# Patient Record
Sex: MALE | Race: WHITE | NOT HISPANIC OR LATINO | Employment: FULL TIME | URBAN - METROPOLITAN AREA
[De-identification: names, ages, dates, MRNs, and addresses within clinical notes are randomized per-mention and may not be internally consistent; named-entity substitution may affect disease eponyms.]

---

## 2019-05-07 ENCOUNTER — TRANSFERRED RECORDS (OUTPATIENT)
Dept: HEALTH INFORMATION MANAGEMENT | Facility: CLINIC | Age: 54
End: 2019-05-07

## 2019-05-08 ENCOUNTER — TRANSFERRED RECORDS (OUTPATIENT)
Dept: HEALTH INFORMATION MANAGEMENT | Facility: CLINIC | Age: 54
End: 2019-05-08

## 2019-05-08 ENCOUNTER — MEDICAL CORRESPONDENCE (OUTPATIENT)
Dept: HEALTH INFORMATION MANAGEMENT | Facility: CLINIC | Age: 54
End: 2019-05-08

## 2019-05-29 DIAGNOSIS — Z98.1 S/P LUMBAR SPINAL FUSION: Primary | ICD-10-CM

## 2019-05-29 DIAGNOSIS — M54.9 BACK PAIN: ICD-10-CM

## 2019-07-13 ASSESSMENT — ENCOUNTER SYMPTOMS
MYALGIAS: 1
TREMORS: 1
NECK PAIN: 0
SEIZURES: 0
MUSCLE CRAMPS: 1
SWOLLEN GLANDS: 0
SPEECH CHANGE: 0
STIFFNESS: 1
HEADACHES: 0
ARTHRALGIAS: 1
DISTURBANCES IN COORDINATION: 0
LOSS OF CONSCIOUSNESS: 0
PARALYSIS: 0
NUMBNESS: 1
BRUISES/BLEEDS EASILY: 1
WEAKNESS: 1
MEMORY LOSS: 1
TINGLING: 1
BACK PAIN: 1
JOINT SWELLING: 1
MUSCLE WEAKNESS: 1
DIZZINESS: 0

## 2019-07-15 ENCOUNTER — ANCILLARY PROCEDURE (OUTPATIENT)
Dept: GENERAL RADIOLOGY | Facility: CLINIC | Age: 54
End: 2019-07-15
Attending: NEUROLOGICAL SURGERY
Payer: COMMERCIAL

## 2019-07-15 ENCOUNTER — OFFICE VISIT (OUTPATIENT)
Dept: NEUROSURGERY | Facility: CLINIC | Age: 54
End: 2019-07-15
Payer: COMMERCIAL

## 2019-07-15 ENCOUNTER — ANCILLARY PROCEDURE (OUTPATIENT)
Dept: CT IMAGING | Facility: CLINIC | Age: 54
End: 2019-07-15
Attending: NEUROLOGICAL SURGERY
Payer: COMMERCIAL

## 2019-07-15 VITALS
DIASTOLIC BLOOD PRESSURE: 89 MMHG | OXYGEN SATURATION: 98 % | SYSTOLIC BLOOD PRESSURE: 128 MMHG | HEART RATE: 80 BPM | WEIGHT: 212 LBS

## 2019-07-15 DIAGNOSIS — Z98.1 S/P LUMBAR SPINAL FUSION: ICD-10-CM

## 2019-07-15 DIAGNOSIS — M54.9 BACK PAIN: ICD-10-CM

## 2019-07-15 DIAGNOSIS — M48.061 SPINAL STENOSIS OF LUMBAR REGION WITH RADICULOPATHY: Primary | ICD-10-CM

## 2019-07-15 DIAGNOSIS — M54.16 SPINAL STENOSIS OF LUMBAR REGION WITH RADICULOPATHY: Primary | ICD-10-CM

## 2019-07-15 RX ORDER — ATENOLOL 50 MG/1
25 TABLET ORAL DAILY
COMMUNITY

## 2019-07-15 RX ORDER — LOSARTAN POTASSIUM AND HYDROCHLOROTHIAZIDE 25; 100 MG/1; MG/1
1 TABLET ORAL DAILY
COMMUNITY
Start: 2019-06-27

## 2019-07-15 RX ORDER — ALLOPURINOL 300 MG/1
300 TABLET ORAL
Status: ON HOLD | COMMUNITY
End: 2020-01-02

## 2019-07-15 SDOH — HEALTH STABILITY: MENTAL HEALTH: HOW MANY STANDARD DRINKS CONTAINING ALCOHOL DO YOU HAVE ON A TYPICAL DAY?: 3 OR 4

## 2019-07-15 SDOH — HEALTH STABILITY: MENTAL HEALTH: HOW OFTEN DO YOU HAVE A DRINK CONTAINING ALCOHOL?: 4 OR MORE TIMES A WEEK

## 2019-07-15 ASSESSMENT — PAIN SCALES - GENERAL: PAINLEVEL: SEVERE PAIN (7)

## 2019-07-15 NOTE — NURSING NOTE
Chief Complaint   Patient presents with     New Patient     [facility]       Diana Vizcarra, EMT

## 2019-07-15 NOTE — LETTER
Date:July 19, 2019      Patient was self referred, no letter generated. Do not send.        [facility] Health Information

## 2019-07-15 NOTE — LETTER
7/15/2019       RE: Doni Gallardo  [address]  [facility] [address]     Dear Colleague,    Thank you for referring your patient, Doni Gallardo, to the [facility] NEUROSURGERY at [facility]. Please see a copy of my visit note below.    7/15/2019     Clinic Note    Reason for visit: back and left leg pain    History of present illness:  52 y/o M w/ hx right radiculopathy, s/p L3-4 fusion in 2014 and s/p L4-L5 fusion in 2017. The patient reports that after his first surgery he had not relieve of his symptoms and his fusion was extended to L5. However, since that second surgery, he has been suffering from radiculopathy in his leg L>R. He had a left L2-3 transforaminal SHANIQUE without relief. Now, he complains about buttock pain bilateral that wakes him up several times at night. The pain becomes worse upon walking or standing. The pain is of burning quality and sometimes is shooting into his right leg, particularly when bending over. He also reports some numbness in his legs bilateral, that reaches the plantar side of his feet. He denies tingling in his legs but feels his gait is somewhat wobbly. He has tried physical therapy but no injections.    He denies fever, chills or urinary or stool incontinence, but reports ED.      Review of systems: 10 point ROS negative except for as detailed in HPI  Past Medical History:   No past medical history on file.  Surgical History:   No past surgical history on file.  Medications:  Current Outpatient Medications   Medication     losartan-hydrochlorothiazide (HYZAAR) 100-25 MG tablet     allopurinol (ZYLOPRIM) 300 MG tablet     atenolol (TENORMIN) 50 MG tablet     omeprazole (PRILOSEC) 20 MG DR capsule     No current facility-administered medications for this visit.        Physical exam:   /89   Pulse 80   Wt 96.2 kg (212 lb)   SpO2 98%     General: Awake and alert and in no acute distress.  Pulm: Breathing comfortably on room  air  CN: Symmetric browlift, smile, tongue protrusion, palate elevation, and sternocleidomastoids. No dysarthria. Extraocular muscles are all intact. Pupils react bilaterally and equally  Coordination: Intact finger-nose-finger bilaterally. Symmetric rapid alternating movements in bilateral upper extremities   Gait: Intact tandem gait. Negative Romberg    Motor:  Normal bulk / tone; no tremor, rigidity, or bradykinesia.  No muscle wasting or fasciculations  No Pronator Drift       Delt Bi Tri Hand Flexion/  Extension Iliopsoas Quadriceps Hamstrings Tibialis Anterior Gastroc     C5 C6 C7 C8/T1 L2 L3 L4-S1 L4 S1   R 5 5 5 5 5 5 5 5 5   L 5 5 5 5 5 4- 5 5 5   Sensory:  intact to LT x 4 extremities.       Reflexes:       Bi Tri BR Harpreet Pat Ach Bab     C5-6 C7-8 C6 UMN L2-4 S1 UMN   R 2+ 2+ 2+ Norm 2+ 2+ Norm   L 2+ 2+ 2+ Norm 3+ 2+ Norm      Mild clonus b/l      Imaging:  CT lumbar (7/15/2019)  1. Postsurgical changes of L3-L5 instrumented spinal fusion. Lucency  surrounding bilateral L5 pedicle screws, right greater than left,  suggesting loosening. Minimal bony fusion across the vertebral bodies  of L3-5.   2. Stepwise anterolisthesis of L3-L5 and grade 1 retrolisthesis at  L5-S1.   3. Multilevel lumbar spondylosis with severe bilateral neural  foraminal stenosis at L5-S1.      Assessment:  52 y/o M w/ hx right radiculopathy, s/p L3-4 fusion in 2014 and s/p L4-L5 fusion in 2017 presents with primary complains of symptomatic foraminal stenosis L5-S1.      Plan:  - offered surgery: L5-S1 foraminotomy and discectomy or fusion extension to L2 and S1      Patient seen and discussed with MD Josep England MD  Neurosurgery, PGY-1    We discussed his symptoms and I think there are two options: a smaller option for a hemilaminectomy/foramenotomy L L5-S1. He also has significant issues with sagittal balance and pseudarthrosis that may also be causing his symptoms which would require a larger operation to fuse  from L2-Pelvis, TLIF L2-3, L5-S1 and revision TLIF L4-5. He will consider his options.    I saw the patient with the resident.  I have reviewed and edited the resident note and agree with the plan of care.      Satish Camara MD       Answers for HPI/ROS submitted by the patient on 7/13/2019   General Symptoms: No  Skin Symptoms: No  HENT Symptoms: No  EYE SYMPTOMS: No  HEART SYMPTOMS: No  LUNG SYMPTOMS: No  INTESTINAL SYMPTOMS: No  URINARY SYMPTOMS: No  REPRODUCTIVE SYMPTOMS: Yes  SKELETAL SYMPTOMS: Yes  BLOOD SYMPTOMS: Yes  NERVOUS SYSTEM SYMPTOMS: Yes  MENTAL HEALTH SYMPTOMS: No  Back pain: Yes  Muscle aches: Yes  Neck pain: No  Swollen joints: Yes  Joint pain: Yes  Bone pain: Yes  Muscle cramps: Yes  Muscle weakness: Yes  Joint stiffness: Yes  Bone fracture: No  Anemia: No  Swollen glands: No  Easy bleeding or bruising: Yes  Edema or swelling: No  Trouble with coordination: No  Dizziness or trouble with balance: No  Fainting or black-out spells: No  Memory loss: Yes  Headache: No  Seizures: No  Speech problems: No  Tingling: Yes  Tremor: Yes  Weakness: Yes  Difficulty walking: Yes  Paralysis: No  Numbness: Yes  Scrotal pain or swelling: No  Erectile dysfunction: Yes  Penile discharge: No  Genital ulcers: No  Reduced libido: No      Again, thank you for allowing me to participate in the care of your patient.      Sincerely,    Satish Camara MD

## 2019-07-15 NOTE — PROGRESS NOTES
7/15/2019     Clinic Note    Reason for visit: back and left leg pain    History of present illness:  52 y/o M w/ hx right radiculopathy, s/p L3-4 fusion in 2014 and s/p L4-L5 fusion in 2017. The patient reports that after his first surgery he had not relieve of his symptoms and his fusion was extended to L5. However, since that second surgery, he has been suffering from radiculopathy in his leg L>R. He had a left L2-3 transforaminal SHANIQUE without relief. Now, he complains about buttock pain bilateral that wakes him up several times at night. The pain becomes worse upon walking or standing. The pain is of burning quality and sometimes is shooting into his right leg, particularly when bending over. He also reports some numbness in his legs bilateral, that reaches the plantar side of his feet. He denies tingling in his legs but feels his gait is somewhat wobbly. He has tried physical therapy but no injections.    He denies fever, chills or urinary or stool incontinence, but reports ED.      Review of systems: 10 point ROS negative except for as detailed in HPI  Past Medical History:   No past medical history on file.  Surgical History:   No past surgical history on file.  Medications:  Current Outpatient Medications   Medication     losartan-hydrochlorothiazide (HYZAAR) 100-25 MG tablet     allopurinol (ZYLOPRIM) 300 MG tablet     atenolol (TENORMIN) 50 MG tablet     omeprazole (PRILOSEC) 20 MG DR capsule     No current facility-administered medications for this visit.        Physical exam:   /89   Pulse 80   Wt 96.2 kg (212 lb)   SpO2 98%     General: Awake and alert and in no acute distress.  Pulm: Breathing comfortably on room air  CN: Symmetric browlift, smile, tongue protrusion, palate elevation, and sternocleidomastoids. No dysarthria. Extraocular muscles are all intact. Pupils react bilaterally and equally  Coordination: Intact finger-nose-finger bilaterally. Symmetric rapid alternating movements in  bilateral upper extremities   Gait: Intact tandem gait. Negative Romberg    Motor:  Normal bulk / tone; no tremor, rigidity, or bradykinesia.  No muscle wasting or fasciculations  No Pronator Drift       Delt Bi Tri Hand Flexion/  Extension Iliopsoas Quadriceps Hamstrings Tibialis Anterior Gastroc     C5 C6 C7 C8/T1 L2 L3 L4-S1 L4 S1   R 5 5 5 5 5 5 5 5 5   L 5 5 5 5 5 4- 5 5 5   Sensory:  intact to LT x 4 extremities.       Reflexes:       Bi Tri BR Harpreet Pat Ach Bab     C5-6 C7-8 C6 UMN L2-4 S1 UMN   R 2+ 2+ 2+ Norm 2+ 2+ Norm   L 2+ 2+ 2+ Norm 3+ 2+ Norm      Mild clonus b/l      Imaging:  CT lumbar (7/15/2019)  1. Postsurgical changes of L3-L5 instrumented spinal fusion. Lucency  surrounding bilateral L5 pedicle screws, right greater than left,  suggesting loosening. Minimal bony fusion across the vertebral bodies  of L3-5.   2. Stepwise anterolisthesis of L3-L5 and grade 1 retrolisthesis at  L5-S1.   3. Multilevel lumbar spondylosis with severe bilateral neural  foraminal stenosis at L5-S1.      Assessment:  54 y/o M w/ hx right radiculopathy, s/p L3-4 fusion in 2014 and s/p L4-L5 fusion in 2017 presents with primary complains of symptomatic foraminal stenosis L5-S1.      Plan:  - offered surgery: L5-S1 foraminotomy and discectomy or fusion extension to L2 and S1      Patient seen and discussed with MD Josep England MD  Neurosurgery, PGY-1    We discussed his symptoms and I think there are two options: a smaller option for a hemilaminectomy/foramenotomy L L5-S1. He also has significant issues with sagittal balance and pseudarthrosis that may also be causing his symptoms which would require a larger operation to fuse from L2-Pelvis, TLIF L2-3, L5-S1 and revision TLIF L4-5. He will consider his options.    I saw the patient with the resident.  I have reviewed and edited the resident note and agree with the plan of care.      Satish Camara MD       Answers for HPI/ROS submitted by the patient  on 7/13/2019   General Symptoms: No  Skin Symptoms: No  HENT Symptoms: No  EYE SYMPTOMS: No  HEART SYMPTOMS: No  LUNG SYMPTOMS: No  INTESTINAL SYMPTOMS: No  URINARY SYMPTOMS: No  REPRODUCTIVE SYMPTOMS: Yes  SKELETAL SYMPTOMS: Yes  BLOOD SYMPTOMS: Yes  NERVOUS SYSTEM SYMPTOMS: Yes  MENTAL HEALTH SYMPTOMS: No  Back pain: Yes  Muscle aches: Yes  Neck pain: No  Swollen joints: Yes  Joint pain: Yes  Bone pain: Yes  Muscle cramps: Yes  Muscle weakness: Yes  Joint stiffness: Yes  Bone fracture: No  Anemia: No  Swollen glands: No  Easy bleeding or bruising: Yes  Edema or swelling: No  Trouble with coordination: No  Dizziness or trouble with balance: No  Fainting or black-out spells: No  Memory loss: Yes  Headache: No  Seizures: No  Speech problems: No  Tingling: Yes  Tremor: Yes  Weakness: Yes  Difficulty walking: Yes  Paralysis: No  Numbness: Yes  Scrotal pain or swelling: No  Erectile dysfunction: Yes  Penile discharge: No  Genital ulcers: No  Reduced libido: No

## 2019-07-17 PROBLEM — M54.16 SPINAL STENOSIS OF LUMBAR REGION WITH RADICULOPATHY: Status: ACTIVE | Noted: 2019-07-17

## 2019-07-17 PROBLEM — M48.061 SPINAL STENOSIS OF LUMBAR REGION WITH RADICULOPATHY: Status: ACTIVE | Noted: 2019-07-17

## 2019-11-04 ENCOUNTER — HEALTH MAINTENANCE LETTER (OUTPATIENT)
Age: 54
End: 2019-11-04

## 2019-11-25 ENCOUNTER — OFFICE VISIT (OUTPATIENT)
Dept: NEUROSURGERY | Facility: CLINIC | Age: 54
End: 2019-11-25
Attending: NEUROLOGICAL SURGERY
Payer: COMMERCIAL

## 2019-11-25 VITALS
DIASTOLIC BLOOD PRESSURE: 74 MMHG | HEART RATE: 92 BPM | WEIGHT: 202 LBS | HEIGHT: 72 IN | TEMPERATURE: 98.4 F | SYSTOLIC BLOOD PRESSURE: 103 MMHG | OXYGEN SATURATION: 99 % | BODY MASS INDEX: 27.36 KG/M2

## 2019-11-25 DIAGNOSIS — M48.061 SPINAL STENOSIS OF LUMBAR REGION WITH RADICULOPATHY: Primary | ICD-10-CM

## 2019-11-25 DIAGNOSIS — M54.16 SPINAL STENOSIS OF LUMBAR REGION WITH RADICULOPATHY: Primary | ICD-10-CM

## 2019-11-25 DIAGNOSIS — M40.30 FLAT BACK SYNDROME, ACQUIRED: ICD-10-CM

## 2019-11-25 PROCEDURE — 99215 OFFICE O/P EST HI 40 MIN: CPT | Performed by: NEUROLOGICAL SURGERY

## 2019-11-25 PROCEDURE — G0463 HOSPITAL OUTPT CLINIC VISIT: HCPCS

## 2019-11-25 RX ORDER — TRAMADOL HYDROCHLORIDE 50 MG/1
50-100 TABLET ORAL EVERY 6 HOURS PRN
Status: ON HOLD | COMMUNITY
End: 2020-01-04

## 2019-11-25 ASSESSMENT — PAIN SCALES - GENERAL: PAINLEVEL: MODERATE PAIN (4)

## 2019-11-25 ASSESSMENT — MIFFLIN-ST. JEOR: SCORE: 1784.27

## 2019-11-25 NOTE — PROGRESS NOTES
It was a pleasure to see Doni Gallardo today in Neurosurgery Clinic. He is a 54 year old male who was last seen by me in July 2019.  He returns today for follow-up and to further discuss possible surgical intervention.  To summarize his symptoms he continues to have back and leg pain, 70% in the back to 30% in the legs.  The pain tends to involves the thighs particularly the lateral and front of the thighs that goes occasionally down to the feet but with some tingling on the bottom of his feet as well.  He has had multiple previous injections and previous fusion at L3-4 than L4-5.  He recently had a test injection for L5-S1 radiofrequency ablation which did not help.    History reviewed. No pertinent past medical history.  History reviewed. No pertinent surgical history.     Allergies   Allergen Reactions     Diazepam      Other reaction(s): Hallucinations  Saw truck in room after being given Valium 2 mg oral- as patient and wife stated     Pregabalin Other (See Comments)     Swelling       Current Outpatient Medications:      atenolol (TENORMIN) 50 MG tablet, Take 25 mg by mouth , Disp: , Rfl:      losartan-hydrochlorothiazide (HYZAAR) 100-25 MG tablet, Take 1 tablet by mouth, Disp: , Rfl:      omeprazole (PRILOSEC) 20 MG DR capsule, Take 20 mg by mouth, Disp: , Rfl:      traMADol (ULTRAM) 50 MG tablet, Take 50 mg by mouth as needed for severe pain, Disp: , Rfl:      allopurinol (ZYLOPRIM) 300 MG tablet, Take 300 mg by mouth, Disp: , Rfl:   Social History     Socioeconomic History     Marital status:      Spouse name: None     Number of children: None     Years of education: None     Highest education level: None   Occupational History     None   Social Needs     Financial resource strain: None     Food insecurity:     Worry: None     Inability: None     Transportation needs:     Medical: None     Non-medical: None   Tobacco Use     Smoking status: Never Smoker     Smokeless tobacco: Never Used    Substance and Sexual Activity     Alcohol use: Yes     Alcohol/week: 28.0 standard drinks     Types: 28 Shots of liquor per week     Frequency: 4 or more times a week     Drinks per session: 3 or 4     Drug use: Never     Sexual activity: None   Lifestyle     Physical activity:     Days per week: None     Minutes per session: None     Stress: None   Relationships     Social connections:     Talks on phone: None     Gets together: None     Attends Congregational service: None     Active member of club or organization: None     Attends meetings of clubs or organizations: None     Relationship status: None     Intimate partner violence:     Fear of current or ex partner: None     Emotionally abused: None     Physically abused: None     Forced sexual activity: None   Other Topics Concern     None   Social History Narrative     None          ROS: 10 point ROS neg other than the symptoms noted above in the HPI.    Vitals:    11/25/19 1243   BP: 103/74   Pulse: 92   Temp: 98.4  F (36.9  C)   TempSrc: Oral   SpO2: 99%   Weight: 202 lb (91.6 kg)   Height: 6' (1.829 m)     Body mass index is 27.4 kg/m .  Moderate Pain (4)    Oswestry (KJ) Questionnaire    No flowsheet data found.    Visual Analog Scale (VAS) Questionnaire    No flowsheet data found.       Awake alert and oriented  Neurologically stable.    Imaging: We reviewed his previous imaging.  There are foraminal problems at L5-S1 which may be driving some of his radicular symptoms.  He has a possible pseudoarthrosis at L4-5 with haloing around 1 of the screws.  His overall sagittal balance appears slightly positive with lumbar lordosis of 39 degrees and pelvic incidence of 63 degrees.  Imaging was reviewed with the patient and shown the patient in clinic today.    Assessment: 1.  History of previous lumbar fusions 2.  Lumbar foraminal stenosis with radiculopathy.  3.  Lumbar flat back syndrome with PI/LL mismatch.    Plan: The patient had many questions about possible  surgical intervention for his back.  In review of my previous recommendations, I think that extension of his fusion down to the pelvis with TLIF at L5-S1 would be the best course of action.  We discussed that understanding whether this would significantly help his symptoms is difficult particularly in patients with multiple previous operations.  We also briefly discussed other options including spinal cord stimulation.  Patient and his wife will consider their options and will let us know if he wishes to proceed with surgery.    Please note that greater than 50% of the visit time of 45 minutes was spent in counseling and coordination of care.

## 2019-11-25 NOTE — LETTER
11/25/2019         RE: Doni Gallardo  [address]  \[facility]\"" [address]        Dear Colleague,    Thank you for referring your patient, Doni Gallardo, to the [facility]. Please see a copy of my visit note below.    It was a pleasure to see Doni Gallardo today in Neurosurgery Clinic. He is a 54 year old male who was last seen by me in July 2019.  He returns today for follow-up and to further discuss possible surgical intervention.  To summarize his symptoms he continues to have back and leg pain, 70% in the back to 30% in the legs.  The pain tends to involves the thighs particularly the lateral and front of the thighs that goes occasionally down to the feet but with some tingling on the bottom of his feet as well.  He has had multiple previous injections and previous fusion at L3-4 than L4-5.  He recently had a test injection for L5-S1 radiofrequency ablation which did not help.    History reviewed. No pertinent past medical history.  History reviewed. No pertinent surgical history.     Allergies   Allergen Reactions     Diazepam      Other reaction(s): Hallucinations  Saw truck in room after being given Valium 2 mg oral- as patient and wife stated     Pregabalin Other (See Comments)     Swelling       Current Outpatient Medications:      atenolol (TENORMIN) 50 MG tablet, Take 25 mg by mouth , Disp: , Rfl:      losartan-hydrochlorothiazide (HYZAAR) 100-25 MG tablet, Take 1 tablet by mouth, Disp: , Rfl:      omeprazole (PRILOSEC) 20 MG DR capsule, Take 20 mg by mouth, Disp: , Rfl:      traMADol (ULTRAM) 50 MG tablet, Take 50 mg by mouth as needed for severe pain, Disp: , Rfl:      allopurinol (ZYLOPRIM) 300 MG tablet, Take 300 mg by mouth, Disp: , Rfl:   Social History     Socioeconomic History     Marital status:      Spouse name: None     Number of children: None     Years of education: None     Highest education level: None   Occupational History     None   Social  Needs     Financial resource strain: None     Food insecurity:     Worry: None     Inability: None     Transportation needs:     Medical: None     Non-medical: None   Tobacco Use     Smoking status: Never Smoker     Smokeless tobacco: Never Used   Substance and Sexual Activity     Alcohol use: Yes     Alcohol/week: 28.0 standard drinks     Types: 28 Shots of liquor per week     Frequency: 4 or more times a week     Drinks per session: 3 or 4     Drug use: Never     Sexual activity: None   Lifestyle     Physical activity:     Days per week: None     Minutes per session: None     Stress: None   Relationships     Social connections:     Talks on phone: None     Gets together: None     Attends Denominational service: None     Active member of club or organization: None     Attends meetings of clubs or organizations: None     Relationship status: None     Intimate partner violence:     Fear of current or ex partner: None     Emotionally abused: None     Physically abused: None     Forced sexual activity: None   Other Topics Concern     None   Social History Narrative     None          ROS: 10 point ROS neg other than the symptoms noted above in the HPI.    Vitals:    11/25/19 1243   BP: 103/74   Pulse: 92   Temp: 98.4  F (36.9  C)   TempSrc: Oral   SpO2: 99%   Weight: 202 lb (91.6 kg)   Height: 6' (1.829 m)     Body mass index is 27.4 kg/m .  Moderate Pain (4)    Oswestry (KJ) Questionnaire    No flowsheet data found.    Visual Analog Scale (VAS) Questionnaire    No flowsheet data found.       Awake alert and oriented  Neurologically stable.    Imaging: We reviewed his previous imaging.  There are foraminal problems at L5-S1 which may be driving some of his radicular symptoms.  He has a possible pseudoarthrosis at L4-5 with haloing around 1 of the screws.  His overall sagittal balance appears slightly positive with lumbar lordosis of 39 degrees and pelvic incidence of 63 degrees.  Imaging was reviewed with the patient and  shown the patient in clinic today.    Assessment: 1.  History of previous lumbar fusions 2.  Lumbar foraminal stenosis with radiculopathy.  3.  Lumbar flat back syndrome with PI/LL mismatch.    Plan: The patient had many questions about possible surgical intervention for his back.  In review of my previous recommendations, I think that extension of his fusion down to the pelvis with TLIF at L5-S1 would be the best course of action.  We discussed that understanding whether this would significantly help his symptoms is difficult particularly in patients with multiple previous operations.  We also briefly discussed other options including spinal cord stimulation.  Patient and his wife will consider their options and will let us know if he wishes to proceed with surgery.    Please note that greater than 50% of the visit time of 45 minutes was spent in counseling and coordination of care.          Again, thank you for allowing me to participate in the care of your patient.        Sincerely,        Satish Camara MD

## 2019-11-25 NOTE — PATIENT INSTRUCTIONS
-Please call clinic at [phone] if you wish to proceed with surgery.       [facility]   Phone: [phone]  Fax: [phone]

## 2019-11-25 NOTE — NURSING NOTE
Doni Gallardo is a 54 year old male who presents for:  Chief Complaint   Patient presents with     Neurologic Problem     surgical consult, spinal stenosis of lumbar region         Initial Vitals:  /74   Pulse 92   Temp 98.4  F (36.9  C) (Oral)   Ht 6' (1.829 m)   Wt 202 lb (91.6 kg)   SpO2 99%   BMI 27.40 kg/m   Estimated body mass index is 27.4 kg/m  as calculated from the following:    Height as of this encounter: 6' (1.829 m).    Weight as of this encounter: 202 lb (91.6 kg).. Body surface area is 2.16 meters squared. BP completed using cuff size: large  Moderate Pain (4)              Suni White RN

## 2019-11-30 ENCOUNTER — PREP FOR PROCEDURE (OUTPATIENT)
Dept: NEUROSURGERY | Facility: CLINIC | Age: 54
End: 2019-11-30

## 2019-11-30 DIAGNOSIS — M40.30 FLAT BACK SYNDROME, ACQUIRED: ICD-10-CM

## 2019-11-30 DIAGNOSIS — M48.061 SPINAL STENOSIS OF LUMBAR REGION WITH RADICULOPATHY: Primary | ICD-10-CM

## 2019-11-30 DIAGNOSIS — M54.16 SPINAL STENOSIS OF LUMBAR REGION WITH RADICULOPATHY: Primary | ICD-10-CM

## 2019-12-27 ENCOUNTER — TRANSFERRED RECORDS (OUTPATIENT)
Dept: HEALTH INFORMATION MANAGEMENT | Facility: CLINIC | Age: 54
End: 2019-12-27

## 2019-12-27 LAB
CREAT SERPL-MCNC: 1.6 MG/DL (ref 0.7–1.3)
GFR SERPL CREATININE-BSD FRML MDRD: 47 ML/MIN/1.73M2
GLUCOSE SERPL-MCNC: 93 MG/DL (ref 70–105)
POTASSIUM SERPL-SCNC: 4.1 MMOL/L (ref 3.5–5.1)

## 2019-12-30 ENCOUNTER — TRANSFERRED RECORDS (OUTPATIENT)
Dept: HEALTH INFORMATION MANAGEMENT | Facility: CLINIC | Age: 54
End: 2019-12-30

## 2019-12-30 LAB — EJECTION FRACTION: 60 %

## 2020-01-02 ENCOUNTER — APPOINTMENT (OUTPATIENT)
Dept: GENERAL RADIOLOGY | Facility: CLINIC | Age: 55
DRG: 455 | End: 2020-01-02
Attending: NEUROLOGICAL SURGERY
Payer: COMMERCIAL

## 2020-01-02 ENCOUNTER — ANESTHESIA EVENT (OUTPATIENT)
Dept: SURGERY | Facility: CLINIC | Age: 55
DRG: 455 | End: 2020-01-02
Payer: COMMERCIAL

## 2020-01-02 ENCOUNTER — HOSPITAL ENCOUNTER (INPATIENT)
Facility: CLINIC | Age: 55
LOS: 2 days | Discharge: HOME | DRG: 455 | End: 2020-01-04
Attending: NEUROLOGICAL SURGERY | Admitting: NEUROLOGICAL SURGERY
Payer: COMMERCIAL

## 2020-01-02 ENCOUNTER — ANESTHESIA (OUTPATIENT)
Dept: SURGERY | Facility: CLINIC | Age: 55
DRG: 455 | End: 2020-01-02
Payer: COMMERCIAL

## 2020-01-02 ENCOUNTER — APPOINTMENT (OUTPATIENT)
Dept: GENERAL RADIOLOGY | Facility: CLINIC | Age: 55
DRG: 455 | End: 2020-01-02
Attending: PHYSICIAN ASSISTANT
Payer: COMMERCIAL

## 2020-01-02 DIAGNOSIS — M40.30 FLAT BACK SYNDROME, ACQUIRED: ICD-10-CM

## 2020-01-02 DIAGNOSIS — M54.16 SPINAL STENOSIS OF LUMBAR REGION WITH RADICULOPATHY: Primary | ICD-10-CM

## 2020-01-02 DIAGNOSIS — M48.061 SPINAL STENOSIS OF LUMBAR REGION WITH RADICULOPATHY: Primary | ICD-10-CM

## 2020-01-02 PROBLEM — Z41.9 SURGERY, ELECTIVE: Status: ACTIVE | Noted: 2020-01-02

## 2020-01-02 LAB
ABO + RH BLD: NORMAL
ABO + RH BLD: NORMAL
BLD GP AB SCN SERPL QL: NORMAL
BLOOD BANK CMNT PATIENT-IMP: NORMAL
POTASSIUM SERPL-SCNC: 3.4 MMOL/L (ref 3.4–5.3)
SPECIMEN EXP DATE BLD: NORMAL

## 2020-01-02 PROCEDURE — 25000132 ZZH RX MED GY IP 250 OP 250 PS 637: Performed by: NURSE PRACTITIONER

## 2020-01-02 PROCEDURE — 25000132 ZZH RX MED GY IP 250 OP 250 PS 637: Performed by: NEUROLOGICAL SURGERY

## 2020-01-02 PROCEDURE — 25000128 H RX IP 250 OP 636: Performed by: NURSE PRACTITIONER

## 2020-01-02 PROCEDURE — 40000170 ZZH STATISTIC PRE-PROCEDURE ASSESSMENT II: Performed by: NEUROLOGICAL SURGERY

## 2020-01-02 PROCEDURE — 22633 ARTHRD CMBN 1NTRSPC LUMBAR: CPT | Mod: AS | Performed by: PHYSICIAN ASSISTANT

## 2020-01-02 PROCEDURE — C1713 ANCHOR/SCREW BN/BN,TIS/BN: HCPCS | Performed by: NEUROLOGICAL SURGERY

## 2020-01-02 PROCEDURE — 0SG707Z FUSION OF RIGHT SACROILIAC JOINT WITH AUTOLOGOUS TISSUE SUBSTITUTE, OPEN APPROACH: ICD-10-PCS | Performed by: NEUROLOGICAL SURGERY

## 2020-01-02 PROCEDURE — 25000566 ZZH SEVOFLURANE, EA 15 MIN: Performed by: NEUROLOGICAL SURGERY

## 2020-01-02 PROCEDURE — 0SG807Z FUSION OF LEFT SACROILIAC JOINT WITH AUTOLOGOUS TISSUE SUBSTITUTE, OPEN APPROACH: ICD-10-PCS | Performed by: NEUROLOGICAL SURGERY

## 2020-01-02 PROCEDURE — 25800030 ZZH RX IP 258 OP 636: Performed by: ANESTHESIOLOGY

## 2020-01-02 PROCEDURE — 0QP004Z REMOVAL OF INTERNAL FIXATION DEVICE FROM LUMBAR VERTEBRA, OPEN APPROACH: ICD-10-PCS | Performed by: NEUROLOGICAL SURGERY

## 2020-01-02 PROCEDURE — 71000013 ZZH RECOVERY PHASE 1 LEVEL 1 EA ADDTL HR: Performed by: NEUROLOGICAL SURGERY

## 2020-01-02 PROCEDURE — 36000075 ZZH SURGERY LEVEL 6 EA 15 ADDTL MIN: Performed by: NEUROLOGICAL SURGERY

## 2020-01-02 PROCEDURE — 37000008 ZZH ANESTHESIA TECHNICAL FEE, 1ST 30 MIN: Performed by: NEUROLOGICAL SURGERY

## 2020-01-02 PROCEDURE — 22214 INCIS 1 VERTEBRAL SEG LUMBAR: CPT | Mod: 51 | Performed by: NEUROLOGICAL SURGERY

## 2020-01-02 PROCEDURE — 22848 INSERT PELV FIXATION DEVICE: CPT | Performed by: NEUROLOGICAL SURGERY

## 2020-01-02 PROCEDURE — 25000128 H RX IP 250 OP 636: Performed by: ANESTHESIOLOGY

## 2020-01-02 PROCEDURE — 25000132 ZZH RX MED GY IP 250 OP 250 PS 637: Performed by: PHYSICIAN ASSISTANT

## 2020-01-02 PROCEDURE — 22633 ARTHRD CMBN 1NTRSPC LUMBAR: CPT | Performed by: NEUROLOGICAL SURGERY

## 2020-01-02 PROCEDURE — 22853 INSJ BIOMECHANICAL DEVICE: CPT | Mod: AS | Performed by: PHYSICIAN ASSISTANT

## 2020-01-02 PROCEDURE — 25800030 ZZH RX IP 258 OP 636: Performed by: NURSE ANESTHETIST, CERTIFIED REGISTERED

## 2020-01-02 PROCEDURE — 25000128 H RX IP 250 OP 636: Performed by: NURSE ANESTHETIST, CERTIFIED REGISTERED

## 2020-01-02 PROCEDURE — 40000277 XR SURGERY CARM FLUORO LESS THAN 5 MIN W STILLS

## 2020-01-02 PROCEDURE — 27210794 ZZH OR GENERAL SUPPLY STERILE: Performed by: NEUROLOGICAL SURGERY

## 2020-01-02 PROCEDURE — 25000128 H RX IP 250 OP 636: Performed by: NEUROLOGICAL SURGERY

## 2020-01-02 PROCEDURE — 36000077 ZZH SURGERY LEVEL 6 W FLUORO 1ST 30 MIN: Performed by: NEUROLOGICAL SURGERY

## 2020-01-02 PROCEDURE — 0SG30AJ FUSION OF LUMBOSACRAL JOINT WITH INTERBODY FUSION DEVICE, POSTERIOR APPROACH, ANTERIOR COLUMN, OPEN APPROACH: ICD-10-PCS | Performed by: NEUROLOGICAL SURGERY

## 2020-01-02 PROCEDURE — 25000125 ZZHC RX 250: Performed by: NURSE ANESTHETIST, CERTIFIED REGISTERED

## 2020-01-02 PROCEDURE — 12000000 ZZH R&B MED SURG/OB

## 2020-01-02 PROCEDURE — 25800030 ZZH RX IP 258 OP 636: Performed by: NEUROLOGICAL SURGERY

## 2020-01-02 PROCEDURE — 86850 RBC ANTIBODY SCREEN: CPT | Performed by: NEUROLOGICAL SURGERY

## 2020-01-02 PROCEDURE — 01NB0ZZ RELEASE LUMBAR NERVE, OPEN APPROACH: ICD-10-PCS | Performed by: NEUROLOGICAL SURGERY

## 2020-01-02 PROCEDURE — 25000125 ZZHC RX 250: Performed by: NEUROLOGICAL SURGERY

## 2020-01-02 PROCEDURE — 22842 INSERT SPINE FIXATION DEVICE: CPT | Performed by: NEUROLOGICAL SURGERY

## 2020-01-02 PROCEDURE — 25800030 ZZH RX IP 258 OP 636: Performed by: PHYSICIAN ASSISTANT

## 2020-01-02 PROCEDURE — 71000012 ZZH RECOVERY PHASE 1 LEVEL 1 FIRST HR: Performed by: NEUROLOGICAL SURGERY

## 2020-01-02 PROCEDURE — 0SG1071 FUSION OF 2 OR MORE LUMBAR VERTEBRAL JOINTS WITH AUTOLOGOUS TISSUE SUBSTITUTE, POSTERIOR APPROACH, POSTERIOR COLUMN, OPEN APPROACH: ICD-10-PCS | Performed by: NEUROLOGICAL SURGERY

## 2020-01-02 PROCEDURE — 37000009 ZZH ANESTHESIA TECHNICAL FEE, EACH ADDTL 15 MIN: Performed by: NEUROLOGICAL SURGERY

## 2020-01-02 PROCEDURE — 40000986 XR LUMBAR SPINE 2-3 VIEWS

## 2020-01-02 PROCEDURE — 27810325 ZZHC OR IMPLANT OTHER OPNP: Performed by: NEUROLOGICAL SURGERY

## 2020-01-02 PROCEDURE — 22853 INSJ BIOMECHANICAL DEVICE: CPT | Performed by: NEUROLOGICAL SURGERY

## 2020-01-02 PROCEDURE — P9041 ALBUMIN (HUMAN),5%, 50ML: HCPCS | Performed by: NURSE ANESTHETIST, CERTIFIED REGISTERED

## 2020-01-02 PROCEDURE — 20936 SP BONE AGRFT LOCAL ADD-ON: CPT | Performed by: NEUROLOGICAL SURGERY

## 2020-01-02 PROCEDURE — 84132 ASSAY OF SERUM POTASSIUM: CPT | Performed by: ANESTHESIOLOGY

## 2020-01-02 PROCEDURE — 0SG3071 FUSION OF LUMBOSACRAL JOINT WITH AUTOLOGOUS TISSUE SUBSTITUTE, POSTERIOR APPROACH, POSTERIOR COLUMN, OPEN APPROACH: ICD-10-PCS | Performed by: NEUROLOGICAL SURGERY

## 2020-01-02 PROCEDURE — 22842 INSERT SPINE FIXATION DEVICE: CPT | Mod: AS | Performed by: PHYSICIAN ASSISTANT

## 2020-01-02 PROCEDURE — 36415 COLL VENOUS BLD VENIPUNCTURE: CPT | Performed by: NEUROLOGICAL SURGERY

## 2020-01-02 PROCEDURE — 20930 SP BONE ALGRFT MORSEL ADD-ON: CPT | Performed by: NEUROLOGICAL SURGERY

## 2020-01-02 PROCEDURE — 86901 BLOOD TYPING SEROLOGIC RH(D): CPT | Performed by: NEUROLOGICAL SURGERY

## 2020-01-02 PROCEDURE — C1762 CONN TISS, HUMAN(INC FASCIA): HCPCS | Performed by: NEUROLOGICAL SURGERY

## 2020-01-02 PROCEDURE — 61783 SCAN PROC SPINAL: CPT | Performed by: NEUROLOGICAL SURGERY

## 2020-01-02 PROCEDURE — 22848 INSERT PELV FIXATION DEVICE: CPT | Mod: AS | Performed by: PHYSICIAN ASSISTANT

## 2020-01-02 PROCEDURE — 22214 INCIS 1 VERTEBRAL SEG LUMBAR: CPT | Mod: AS | Performed by: PHYSICIAN ASSISTANT

## 2020-01-02 PROCEDURE — 86900 BLOOD TYPING SEROLOGIC ABO: CPT | Performed by: NEUROLOGICAL SURGERY

## 2020-01-02 DEVICE — IMP SCR SET MEDT SOLERA BREAK OFF 5.5MM TI 5540030: Type: IMPLANTABLE DEVICE | Site: SPINE LUMBAR | Status: FUNCTIONAL

## 2020-01-02 DEVICE — IMPLANTABLE DEVICE: Type: IMPLANTABLE DEVICE | Site: SPINE LUMBAR | Status: FUNCTIONAL

## 2020-01-02 DEVICE — IMP SCR MEDT 5.5/6.0MM SOLERA 7.5X45MM MA 55840007545: Type: IMPLANTABLE DEVICE | Site: SPINE LUMBAR | Status: FUNCTIONAL

## 2020-01-02 DEVICE — GRAFT BONE CRUSH CANC 30ML 400080: Type: IMPLANTABLE DEVICE | Site: SPINE LUMBAR | Status: FUNCTIONAL

## 2020-01-02 DEVICE — IMP SCR MEDT 5.5/6.0MM SOLERA 7.5X55MM MA 55840007555: Type: IMPLANTABLE DEVICE | Site: SPINE LUMBAR | Status: FUNCTIONAL

## 2020-01-02 RX ORDER — TRAMADOL HYDROCHLORIDE 50 MG/1
50-100 TABLET ORAL EVERY 6 HOURS PRN
Status: DISCONTINUED | OUTPATIENT
Start: 2020-01-02 | End: 2020-01-02

## 2020-01-02 RX ORDER — CALCIUM CARBONATE 500 MG/1
1000 TABLET, CHEWABLE ORAL 4 TIMES DAILY PRN
Status: DISCONTINUED | OUTPATIENT
Start: 2020-01-02 | End: 2020-01-04 | Stop reason: HOSPADM

## 2020-01-02 RX ORDER — VITAMIN B COMPLEX
2000 TABLET ORAL DAILY
Status: DISCONTINUED | OUTPATIENT
Start: 2020-01-02 | End: 2020-01-04 | Stop reason: HOSPADM

## 2020-01-02 RX ORDER — AMOXICILLIN 250 MG
1 CAPSULE ORAL 2 TIMES DAILY
Status: DISCONTINUED | OUTPATIENT
Start: 2020-01-02 | End: 2020-01-04 | Stop reason: HOSPADM

## 2020-01-02 RX ORDER — LOSARTAN POTASSIUM AND HYDROCHLOROTHIAZIDE 25; 100 MG/1; MG/1
1 TABLET ORAL DAILY
Status: DISCONTINUED | OUTPATIENT
Start: 2020-01-02 | End: 2020-01-04 | Stop reason: HOSPADM

## 2020-01-02 RX ORDER — FENTANYL CITRATE 50 UG/ML
INJECTION, SOLUTION INTRAMUSCULAR; INTRAVENOUS PRN
Status: DISCONTINUED | OUTPATIENT
Start: 2020-01-02 | End: 2020-01-02

## 2020-01-02 RX ORDER — VANCOMYCIN HYDROCHLORIDE 1 G/20ML
INJECTION, POWDER, LYOPHILIZED, FOR SOLUTION INTRAVENOUS PRN
Status: DISCONTINUED | OUTPATIENT
Start: 2020-01-02 | End: 2020-01-02 | Stop reason: HOSPADM

## 2020-01-02 RX ORDER — MULTIPLE VITAMINS W/ MINERALS TAB 9MG-400MCG
1 TAB ORAL DAILY
Status: DISCONTINUED | OUTPATIENT
Start: 2020-01-03 | End: 2020-01-04 | Stop reason: HOSPADM

## 2020-01-02 RX ORDER — FENTANYL CITRATE 50 UG/ML
25-100 INJECTION, SOLUTION INTRAMUSCULAR; INTRAVENOUS
Status: DISCONTINUED | OUTPATIENT
Start: 2020-01-02 | End: 2020-01-02

## 2020-01-02 RX ORDER — ATENOLOL 25 MG/1
25 TABLET ORAL DAILY
Status: DISCONTINUED | OUTPATIENT
Start: 2020-01-03 | End: 2020-01-04 | Stop reason: HOSPADM

## 2020-01-02 RX ORDER — HYDROMORPHONE HYDROCHLORIDE 1 MG/ML
.3-.5 INJECTION, SOLUTION INTRAMUSCULAR; INTRAVENOUS; SUBCUTANEOUS EVERY 5 MIN PRN
Status: DISCONTINUED | OUTPATIENT
Start: 2020-01-02 | End: 2020-01-02 | Stop reason: HOSPADM

## 2020-01-02 RX ORDER — AMOXICILLIN 250 MG
2 CAPSULE ORAL 2 TIMES DAILY
Status: DISCONTINUED | OUTPATIENT
Start: 2020-01-02 | End: 2020-01-04 | Stop reason: HOSPADM

## 2020-01-02 RX ORDER — VITAMIN B COMPLEX
1000 TABLET ORAL DAILY
Status: DISCONTINUED | OUTPATIENT
Start: 2020-01-02 | End: 2020-01-02

## 2020-01-02 RX ORDER — ALBUMIN, HUMAN INJ 5% 5 %
SOLUTION INTRAVENOUS CONTINUOUS PRN
Status: DISCONTINUED | OUTPATIENT
Start: 2020-01-02 | End: 2020-01-02

## 2020-01-02 RX ORDER — CEFAZOLIN SODIUM 1 G/3ML
1 INJECTION, POWDER, FOR SOLUTION INTRAMUSCULAR; INTRAVENOUS SEE ADMIN INSTRUCTIONS
Status: DISCONTINUED | OUTPATIENT
Start: 2020-01-02 | End: 2020-01-02

## 2020-01-02 RX ORDER — GLYCOPYRROLATE 0.2 MG/ML
INJECTION, SOLUTION INTRAMUSCULAR; INTRAVENOUS PRN
Status: DISCONTINUED | OUTPATIENT
Start: 2020-01-02 | End: 2020-01-02

## 2020-01-02 RX ORDER — NAPROXEN 250 MG/1
250-500 TABLET ORAL DAILY PRN
Status: DISCONTINUED | OUTPATIENT
Start: 2020-01-02 | End: 2020-01-04 | Stop reason: HOSPADM

## 2020-01-02 RX ORDER — HYDROMORPHONE HYDROCHLORIDE 2 MG/1
2-4 TABLET ORAL
Status: DISCONTINUED | OUTPATIENT
Start: 2020-01-02 | End: 2020-01-02

## 2020-01-02 RX ORDER — LABETALOL HYDROCHLORIDE 5 MG/ML
10 INJECTION, SOLUTION INTRAVENOUS
Status: DISCONTINUED | OUTPATIENT
Start: 2020-01-02 | End: 2020-01-02 | Stop reason: HOSPADM

## 2020-01-02 RX ORDER — CEFAZOLIN SODIUM 1 G/3ML
1 INJECTION, POWDER, FOR SOLUTION INTRAMUSCULAR; INTRAVENOUS EVERY 8 HOURS
Status: DISCONTINUED | OUTPATIENT
Start: 2020-01-02 | End: 2020-01-02

## 2020-01-02 RX ORDER — NALOXONE HYDROCHLORIDE 0.4 MG/ML
.1-.4 INJECTION, SOLUTION INTRAMUSCULAR; INTRAVENOUS; SUBCUTANEOUS
Status: DISCONTINUED | OUTPATIENT
Start: 2020-01-02 | End: 2020-01-04 | Stop reason: HOSPADM

## 2020-01-02 RX ORDER — AMOXICILLIN 500 MG
2400 CAPSULE ORAL AT BEDTIME
COMMUNITY

## 2020-01-02 RX ORDER — ONDANSETRON 2 MG/ML
INJECTION INTRAMUSCULAR; INTRAVENOUS PRN
Status: DISCONTINUED | OUTPATIENT
Start: 2020-01-02 | End: 2020-01-02

## 2020-01-02 RX ORDER — ACETAMINOPHEN 325 MG/1
975 TABLET ORAL EVERY 8 HOURS
Status: DISCONTINUED | OUTPATIENT
Start: 2020-01-02 | End: 2020-01-04

## 2020-01-02 RX ORDER — CEFAZOLIN SODIUM 2 G/100ML
2 INJECTION, SOLUTION INTRAVENOUS
Status: COMPLETED | OUTPATIENT
Start: 2020-01-02 | End: 2020-01-02

## 2020-01-02 RX ORDER — LIDOCAINE HYDROCHLORIDE 20 MG/ML
INJECTION, SOLUTION INFILTRATION; PERINEURAL PRN
Status: DISCONTINUED | OUTPATIENT
Start: 2020-01-02 | End: 2020-01-02

## 2020-01-02 RX ORDER — VECURONIUM BROMIDE 1 MG/ML
INJECTION, POWDER, LYOPHILIZED, FOR SOLUTION INTRAVENOUS PRN
Status: DISCONTINUED | OUTPATIENT
Start: 2020-01-02 | End: 2020-01-02

## 2020-01-02 RX ORDER — ACETAMINOPHEN 325 MG/1
650 TABLET ORAL EVERY 4 HOURS PRN
Status: DISCONTINUED | OUTPATIENT
Start: 2020-01-05 | End: 2020-01-04 | Stop reason: HOSPADM

## 2020-01-02 RX ORDER — ONDANSETRON 2 MG/ML
4 INJECTION INTRAMUSCULAR; INTRAVENOUS EVERY 30 MIN PRN
Status: DISCONTINUED | OUTPATIENT
Start: 2020-01-02 | End: 2020-01-02 | Stop reason: HOSPADM

## 2020-01-02 RX ORDER — FENTANYL CITRATE 50 UG/ML
25-50 INJECTION, SOLUTION INTRAMUSCULAR; INTRAVENOUS
Status: DISCONTINUED | OUTPATIENT
Start: 2020-01-02 | End: 2020-01-02 | Stop reason: HOSPADM

## 2020-01-02 RX ORDER — ONDANSETRON 2 MG/ML
4 INJECTION INTRAMUSCULAR; INTRAVENOUS EVERY 6 HOURS PRN
Status: DISCONTINUED | OUTPATIENT
Start: 2020-01-02 | End: 2020-01-04 | Stop reason: HOSPADM

## 2020-01-02 RX ORDER — METHOCARBAMOL 750 MG/1
750 TABLET, FILM COATED ORAL EVERY 6 HOURS PRN
Status: DISCONTINUED | OUTPATIENT
Start: 2020-01-02 | End: 2020-01-04 | Stop reason: HOSPADM

## 2020-01-02 RX ORDER — CHLORAL HYDRATE 500 MG
2000 CAPSULE ORAL AT BEDTIME
Status: DISCONTINUED | OUTPATIENT
Start: 2020-01-03 | End: 2020-01-04 | Stop reason: HOSPADM

## 2020-01-02 RX ORDER — SODIUM CHLORIDE, SODIUM LACTATE, POTASSIUM CHLORIDE, CALCIUM CHLORIDE 600; 310; 30; 20 MG/100ML; MG/100ML; MG/100ML; MG/100ML
INJECTION, SOLUTION INTRAVENOUS CONTINUOUS PRN
Status: DISCONTINUED | OUTPATIENT
Start: 2020-01-02 | End: 2020-01-02

## 2020-01-02 RX ORDER — MULTIPLE VITAMINS W/ MINERALS TAB 9MG-400MCG
1 TAB ORAL DAILY
COMMUNITY

## 2020-01-02 RX ORDER — ONDANSETRON 4 MG/1
4 TABLET, ORALLY DISINTEGRATING ORAL EVERY 30 MIN PRN
Status: DISCONTINUED | OUTPATIENT
Start: 2020-01-02 | End: 2020-01-02 | Stop reason: HOSPADM

## 2020-01-02 RX ORDER — LIDOCAINE 40 MG/G
CREAM TOPICAL
Status: DISCONTINUED | OUTPATIENT
Start: 2020-01-02 | End: 2020-01-04 | Stop reason: HOSPADM

## 2020-01-02 RX ORDER — MORPHINE SULFATE 15 MG/1
30 TABLET, FILM COATED, EXTENDED RELEASE ORAL EVERY 12 HOURS
Status: DISCONTINUED | OUTPATIENT
Start: 2020-01-02 | End: 2020-01-02

## 2020-01-02 RX ORDER — HYDROMORPHONE HYDROCHLORIDE 2 MG/1
2-4 TABLET ORAL
Status: DISCONTINUED | OUTPATIENT
Start: 2020-01-02 | End: 2020-01-04 | Stop reason: HOSPADM

## 2020-01-02 RX ORDER — SODIUM CHLORIDE, SODIUM LACTATE, POTASSIUM CHLORIDE, CALCIUM CHLORIDE 600; 310; 30; 20 MG/100ML; MG/100ML; MG/100ML; MG/100ML
INJECTION, SOLUTION INTRAVENOUS CONTINUOUS
Status: DISCONTINUED | OUTPATIENT
Start: 2020-01-02 | End: 2020-01-02 | Stop reason: HOSPADM

## 2020-01-02 RX ORDER — SIMETHICONE 80 MG
80 TABLET,CHEWABLE ORAL EVERY 6 HOURS PRN
Status: DISCONTINUED | OUTPATIENT
Start: 2020-01-02 | End: 2020-01-04 | Stop reason: HOSPADM

## 2020-01-02 RX ORDER — PROCHLORPERAZINE MALEATE 10 MG
10 TABLET ORAL EVERY 6 HOURS PRN
Status: DISCONTINUED | OUTPATIENT
Start: 2020-01-02 | End: 2020-01-04 | Stop reason: HOSPADM

## 2020-01-02 RX ORDER — ACETAMINOPHEN 325 MG/1
975 TABLET ORAL ONCE
Status: COMPLETED | OUTPATIENT
Start: 2020-01-02 | End: 2020-01-02

## 2020-01-02 RX ORDER — METOCLOPRAMIDE HYDROCHLORIDE 5 MG/ML
10 INJECTION INTRAMUSCULAR; INTRAVENOUS EVERY 6 HOURS PRN
Status: DISCONTINUED | OUTPATIENT
Start: 2020-01-02 | End: 2020-01-04 | Stop reason: HOSPADM

## 2020-01-02 RX ORDER — NAPROXEN SODIUM 220 MG
440-660 TABLET ORAL DAILY PRN
Status: ON HOLD | COMMUNITY
End: 2020-01-04

## 2020-01-02 RX ORDER — PROPOFOL 10 MG/ML
INJECTION, EMULSION INTRAVENOUS PRN
Status: DISCONTINUED | OUTPATIENT
Start: 2020-01-02 | End: 2020-01-02

## 2020-01-02 RX ORDER — SODIUM CHLORIDE, SODIUM LACTATE, POTASSIUM CHLORIDE, CALCIUM CHLORIDE 600; 310; 30; 20 MG/100ML; MG/100ML; MG/100ML; MG/100ML
INJECTION, SOLUTION INTRAVENOUS CONTINUOUS
Status: DISCONTINUED | OUTPATIENT
Start: 2020-01-02 | End: 2020-01-02

## 2020-01-02 RX ORDER — METOCLOPRAMIDE 10 MG/1
10 TABLET ORAL EVERY 6 HOURS PRN
Status: DISCONTINUED | OUTPATIENT
Start: 2020-01-02 | End: 2020-01-04 | Stop reason: HOSPADM

## 2020-01-02 RX ORDER — ACETAMINOPHEN 325 MG/1
975 TABLET ORAL EVERY 8 HOURS
Status: DISCONTINUED | OUTPATIENT
Start: 2020-01-02 | End: 2020-01-04 | Stop reason: HOSPADM

## 2020-01-02 RX ORDER — SODIUM CHLORIDE 9 MG/ML
INJECTION, SOLUTION INTRAVENOUS CONTINUOUS
Status: DISCONTINUED | OUTPATIENT
Start: 2020-01-02 | End: 2020-01-04 | Stop reason: HOSPADM

## 2020-01-02 RX ORDER — ONDANSETRON 4 MG/1
4 TABLET, ORALLY DISINTEGRATING ORAL EVERY 6 HOURS PRN
Status: DISCONTINUED | OUTPATIENT
Start: 2020-01-02 | End: 2020-01-04 | Stop reason: HOSPADM

## 2020-01-02 RX ORDER — ACETAMINOPHEN 325 MG/1
650 TABLET ORAL EVERY 4 HOURS PRN
Status: DISCONTINUED | OUTPATIENT
Start: 2020-01-05 | End: 2020-01-04

## 2020-01-02 RX ORDER — HYDROMORPHONE HYDROCHLORIDE 1 MG/ML
.3-.5 INJECTION, SOLUTION INTRAMUSCULAR; INTRAVENOUS; SUBCUTANEOUS
Status: DISCONTINUED | OUTPATIENT
Start: 2020-01-02 | End: 2020-01-04 | Stop reason: HOSPADM

## 2020-01-02 RX ORDER — NEOSTIGMINE METHYLSULFATE 1 MG/ML
VIAL (ML) INJECTION PRN
Status: DISCONTINUED | OUTPATIENT
Start: 2020-01-02 | End: 2020-01-02

## 2020-01-02 RX ORDER — NALOXONE HYDROCHLORIDE 0.4 MG/ML
.1-.4 INJECTION, SOLUTION INTRAMUSCULAR; INTRAVENOUS; SUBCUTANEOUS
Status: DISCONTINUED | OUTPATIENT
Start: 2020-01-02 | End: 2020-01-02

## 2020-01-02 RX ADMIN — HYDROMORPHONE HYDROCHLORIDE 0.5 MG: 1 INJECTION, SOLUTION INTRAMUSCULAR; INTRAVENOUS; SUBCUTANEOUS at 11:50

## 2020-01-02 RX ADMIN — PROPOFOL 200 MG: 10 INJECTION, EMULSION INTRAVENOUS at 07:44

## 2020-01-02 RX ADMIN — PHENYLEPHRINE HYDROCHLORIDE 100 MCG: 10 INJECTION INTRAVENOUS at 11:28

## 2020-01-02 RX ADMIN — VECURONIUM BROMIDE 3 MG: 1 INJECTION, POWDER, LYOPHILIZED, FOR SOLUTION INTRAVENOUS at 09:04

## 2020-01-02 RX ADMIN — ACETAMINOPHEN 975 MG: 325 TABLET, FILM COATED ORAL at 14:25

## 2020-01-02 RX ADMIN — ROCURONIUM BROMIDE 50 MG: 10 INJECTION INTRAVENOUS at 07:44

## 2020-01-02 RX ADMIN — DEXMEDETOMIDINE HYDROCHLORIDE 8 MCG: 100 INJECTION, SOLUTION INTRAVENOUS at 11:01

## 2020-01-02 RX ADMIN — PHENYLEPHRINE HYDROCHLORIDE 100 MCG: 10 INJECTION INTRAVENOUS at 10:51

## 2020-01-02 RX ADMIN — HYDROMORPHONE HYDROCHLORIDE 0.5 MG: 1 INJECTION, SOLUTION INTRAMUSCULAR; INTRAVENOUS; SUBCUTANEOUS at 12:33

## 2020-01-02 RX ADMIN — PHENYLEPHRINE HYDROCHLORIDE 100 MCG: 10 INJECTION INTRAVENOUS at 11:06

## 2020-01-02 RX ADMIN — PHENYLEPHRINE HYDROCHLORIDE 100 MCG: 10 INJECTION INTRAVENOUS at 10:02

## 2020-01-02 RX ADMIN — PHENYLEPHRINE HYDROCHLORIDE 100 MCG: 10 INJECTION INTRAVENOUS at 10:24

## 2020-01-02 RX ADMIN — HYDROMORPHONE HYDROCHLORIDE 0.5 MG: 1 INJECTION, SOLUTION INTRAMUSCULAR; INTRAVENOUS; SUBCUTANEOUS at 17:06

## 2020-01-02 RX ADMIN — FENTANYL CITRATE 50 MCG: 50 INJECTION, SOLUTION INTRAMUSCULAR; INTRAVENOUS at 11:03

## 2020-01-02 RX ADMIN — SODIUM CHLORIDE: 9 INJECTION, SOLUTION INTRAVENOUS at 15:52

## 2020-01-02 RX ADMIN — HYDROMORPHONE HYDROCHLORIDE 0.5 MG: 1 INJECTION, SOLUTION INTRAMUSCULAR; INTRAVENOUS; SUBCUTANEOUS at 14:24

## 2020-01-02 RX ADMIN — PROPOFOL 50 MG: 10 INJECTION, EMULSION INTRAVENOUS at 11:23

## 2020-01-02 RX ADMIN — SIMETHICONE CHEW TAB 80 MG 80 MG: 80 TABLET ORAL at 15:51

## 2020-01-02 RX ADMIN — PROPOFOL 50 MG: 10 INJECTION, EMULSION INTRAVENOUS at 11:25

## 2020-01-02 RX ADMIN — SENNOSIDES AND DOCUSATE SODIUM 1 TABLET: 8.6; 5 TABLET ORAL at 20:38

## 2020-01-02 RX ADMIN — SODIUM CHLORIDE, POTASSIUM CHLORIDE, SODIUM LACTATE AND CALCIUM CHLORIDE: 600; 310; 30; 20 INJECTION, SOLUTION INTRAVENOUS at 12:48

## 2020-01-02 RX ADMIN — PHENYLEPHRINE HYDROCHLORIDE 100 MCG: 10 INJECTION INTRAVENOUS at 09:19

## 2020-01-02 RX ADMIN — DEXMEDETOMIDINE HYDROCHLORIDE 0.3 MCG/KG/HR: 100 INJECTION, SOLUTION INTRAVENOUS at 08:03

## 2020-01-02 RX ADMIN — PHENYLEPHRINE HYDROCHLORIDE 100 MCG: 10 INJECTION INTRAVENOUS at 09:09

## 2020-01-02 RX ADMIN — SODIUM CHLORIDE, POTASSIUM CHLORIDE, SODIUM LACTATE AND CALCIUM CHLORIDE: 600; 310; 30; 20 INJECTION, SOLUTION INTRAVENOUS at 07:45

## 2020-01-02 RX ADMIN — ACETAMINOPHEN 975 MG: 325 TABLET, FILM COATED ORAL at 06:55

## 2020-01-02 RX ADMIN — HYDROMORPHONE HYDROCHLORIDE 0.5 MG: 1 INJECTION, SOLUTION INTRAMUSCULAR; INTRAVENOUS; SUBCUTANEOUS at 12:09

## 2020-01-02 RX ADMIN — NEOSTIGMINE METHYLSULFATE 4.5 MG: 1 INJECTION, SOLUTION INTRAVENOUS at 11:08

## 2020-01-02 RX ADMIN — ALBUMIN HUMAN: 0.05 INJECTION, SOLUTION INTRAVENOUS at 09:21

## 2020-01-02 RX ADMIN — HYDROMORPHONE HYDROCHLORIDE 0.3 MG: 1 INJECTION, SOLUTION INTRAMUSCULAR; INTRAVENOUS; SUBCUTANEOUS at 18:24

## 2020-01-02 RX ADMIN — PHENYLEPHRINE HYDROCHLORIDE 100 MCG: 10 INJECTION INTRAVENOUS at 08:42

## 2020-01-02 RX ADMIN — GLYCOPYRROLATE 0.7 MG: 0.2 INJECTION, SOLUTION INTRAMUSCULAR; INTRAVENOUS at 11:08

## 2020-01-02 RX ADMIN — HYDROMORPHONE HYDROCHLORIDE 2 MG: 2 TABLET ORAL at 21:15

## 2020-01-02 RX ADMIN — PHENYLEPHRINE HYDROCHLORIDE 100 MCG: 10 INJECTION INTRAVENOUS at 09:49

## 2020-01-02 RX ADMIN — Medication 1 LOZENGE: at 15:51

## 2020-01-02 RX ADMIN — PHENYLEPHRINE HYDROCHLORIDE 100 MCG: 10 INJECTION INTRAVENOUS at 09:21

## 2020-01-02 RX ADMIN — CEFAZOLIN SODIUM 2 G: 2 INJECTION, SOLUTION INTRAVENOUS at 07:51

## 2020-01-02 RX ADMIN — FENTANYL CITRATE 100 MCG: 50 INJECTION, SOLUTION INTRAMUSCULAR; INTRAVENOUS at 07:44

## 2020-01-02 RX ADMIN — SODIUM CHLORIDE, POTASSIUM CHLORIDE, SODIUM LACTATE AND CALCIUM CHLORIDE: 600; 310; 30; 20 INJECTION, SOLUTION INTRAVENOUS at 06:56

## 2020-01-02 RX ADMIN — ONDANSETRON 4 MG: 2 INJECTION INTRAMUSCULAR; INTRAVENOUS at 10:49

## 2020-01-02 RX ADMIN — TRANEXAMIC ACID 1 G: 1 INJECTION, SOLUTION INTRAVENOUS at 08:25

## 2020-01-02 RX ADMIN — PHENYLEPHRINE HYDROCHLORIDE 100 MCG: 10 INJECTION INTRAVENOUS at 08:10

## 2020-01-02 RX ADMIN — VECURONIUM BROMIDE 3 MG: 1 INJECTION, POWDER, LYOPHILIZED, FOR SOLUTION INTRAVENOUS at 09:28

## 2020-01-02 RX ADMIN — VECURONIUM BROMIDE 1 MG: 1 INJECTION, POWDER, LYOPHILIZED, FOR SOLUTION INTRAVENOUS at 10:41

## 2020-01-02 RX ADMIN — HYDROMORPHONE HYDROCHLORIDE 0.5 MG: 1 INJECTION, SOLUTION INTRAMUSCULAR; INTRAVENOUS; SUBCUTANEOUS at 08:15

## 2020-01-02 RX ADMIN — SODIUM CHLORIDE, POTASSIUM CHLORIDE, SODIUM LACTATE AND CALCIUM CHLORIDE: 600; 310; 30; 20 INJECTION, SOLUTION INTRAVENOUS at 12:31

## 2020-01-02 RX ADMIN — TRANEXAMIC ACID 1 G: 100 INJECTION, SOLUTION INTRAVENOUS at 08:08

## 2020-01-02 RX ADMIN — PHENYLEPHRINE HYDROCHLORIDE 100 MCG: 10 INJECTION INTRAVENOUS at 09:03

## 2020-01-02 RX ADMIN — VECURONIUM BROMIDE 2 MG: 1 INJECTION, POWDER, LYOPHILIZED, FOR SOLUTION INTRAVENOUS at 08:29

## 2020-01-02 RX ADMIN — PHENYLEPHRINE HYDROCHLORIDE 100 MCG: 10 INJECTION INTRAVENOUS at 08:55

## 2020-01-02 RX ADMIN — PHENYLEPHRINE HYDROCHLORIDE 100 MCG: 10 INJECTION INTRAVENOUS at 08:33

## 2020-01-02 RX ADMIN — PHENYLEPHRINE HYDROCHLORIDE 100 MCG: 10 INJECTION INTRAVENOUS at 11:13

## 2020-01-02 RX ADMIN — VECURONIUM BROMIDE 2 MG: 1 INJECTION, POWDER, LYOPHILIZED, FOR SOLUTION INTRAVENOUS at 10:07

## 2020-01-02 RX ADMIN — SODIUM CHLORIDE, POTASSIUM CHLORIDE, SODIUM LACTATE AND CALCIUM CHLORIDE: 600; 310; 30; 20 INJECTION, SOLUTION INTRAVENOUS at 11:00

## 2020-01-02 RX ADMIN — CEFAZOLIN SODIUM 1 G: 2 INJECTION, SOLUTION INTRAVENOUS at 09:51

## 2020-01-02 RX ADMIN — DEXMEDETOMIDINE HYDROCHLORIDE 8 MCG: 100 INJECTION, SOLUTION INTRAVENOUS at 07:44

## 2020-01-02 RX ADMIN — FENTANYL CITRATE 25 MCG: 50 INJECTION, SOLUTION INTRAMUSCULAR; INTRAVENOUS at 11:11

## 2020-01-02 RX ADMIN — PHENYLEPHRINE HYDROCHLORIDE 100 MCG: 10 INJECTION INTRAVENOUS at 08:48

## 2020-01-02 RX ADMIN — PHENYLEPHRINE HYDROCHLORIDE 0.2 MCG/KG/MIN: 10 INJECTION INTRAVENOUS at 09:31

## 2020-01-02 RX ADMIN — SODIUM CHLORIDE, POTASSIUM CHLORIDE, SODIUM LACTATE AND CALCIUM CHLORIDE: 600; 310; 30; 20 INJECTION, SOLUTION INTRAVENOUS at 08:46

## 2020-01-02 RX ADMIN — FENTANYL CITRATE 25 MCG: 50 INJECTION, SOLUTION INTRAMUSCULAR; INTRAVENOUS at 11:21

## 2020-01-02 RX ADMIN — HYDROMORPHONE HYDROCHLORIDE 0.4 MG: 1 INJECTION, SOLUTION INTRAMUSCULAR; INTRAVENOUS; SUBCUTANEOUS at 15:50

## 2020-01-02 RX ADMIN — PHENYLEPHRINE HYDROCHLORIDE 100 MCG: 10 INJECTION INTRAVENOUS at 09:11

## 2020-01-02 RX ADMIN — HYDROMORPHONE HYDROCHLORIDE 0.5 MG: 1 INJECTION, SOLUTION INTRAMUSCULAR; INTRAVENOUS; SUBCUTANEOUS at 20:43

## 2020-01-02 ASSESSMENT — MIFFLIN-ST. JEOR: SCORE: 1791.08

## 2020-01-02 ASSESSMENT — ACTIVITIES OF DAILY LIVING (ADL)
ADLS_ACUITY_SCORE: 13
ADLS_ACUITY_SCORE: 13

## 2020-01-02 NOTE — ANESTHESIA CARE TRANSFER NOTE
Patient: Doni Gallardo    Procedure(s):  REMOVAL OF PREVIOUS L3-L5 POSTERIOR SEGMENTAL INSTRUMENTATION; REINSERTION OF INSTRUMENTATION L3-S1; PELVIC INSTRUMENTATION WITH STEALTH NAVIGATION; L5-S1 BILATERAL TRANSFORAMINAL INTERBODY FUSION AND MANUEL PISANO OSTEOTOMY    Diagnosis: Spinal stenosis of lumbar region with radiculopathy [M48.061, M54.16]  Flat back syndrome, acquired [M40.30]  Diagnosis Additional Information: No value filed.    Anesthesia Type:   General, ETT     Note:  Airway :Face Mask  Patient transferred to:PACU  Handoff Report: Identifed the Patient, Identified the Reponsible Provider, Reviewed the pertinent medical history, Discussed the surgical course, Reviewed Intra-OP anesthesia mangement and issues during anesthesia, Set expectations for post-procedure period and Allowed opportunity for questions and acknowledgement of understanding      Vitals: (Last set prior to Anesthesia Care Transfer)    CRNA VITALS  1/2/2020 1103 - 1/2/2020 1143      1/2/2020             Resp Rate (set):  10                Electronically Signed By: BRANDON Grimaldo CRNA  January 2, 2020  11:43 AM

## 2020-01-02 NOTE — PLAN OF CARE
Transferred from PACU. Alert and oriented x4. VSS on 1 L O2 NC. CMS with baseline numbness in toes on bilateral feet, and intermittent numbness in lateral thighs. Strength 5/5 BLEs. Lung sounds clear. BS hypoactive. Incision WDL with intact sutures, IVAN. Hemovac patent with sanguinous drainage. Escamilla patent with adequate o/p. C/o pain at incision site radiating in to R groin. PRN dilaudid and scheduled tylenol administered.

## 2020-01-02 NOTE — OP NOTE
Procedure Date: 01/02/2020      PREOPERATIVE DIAGNOSES:   1.  Lumbar foraminal stenosis with radiculopathy.   2.  Flat back syndrome.      POSTOPERATIVE DIAGNOSES:     1.  Lumbar foraminal stenosis with radiculopathy.   2.  Flat back syndrome.      PROCEDURES:   1.  Removal and replacement of previous L3-L5 instrumentation.   2.  Insertion of bilateral S1 pedicle screws.   3.  Bilateral S2 alar iliac pedicle pelvic instrumentation.   4.  Bilateral L5-S1 transforaminal interbody fusion and Smith-Olivia osteotomy.   5.  Posterior arthrodesis L5-S1.      SURGEON:  Satish Camara MD.      ASSISTANT:  Gumaro Neil PA-C.      ANESTHESIA:  General endotracheal anesthesia plus local anesthetic.      ESTIMATED BLOOD LOSS:  700 mL.      INDICATIONS FOR PROCEDURE:  The patient is a 54-year-old male with a previous L3-L5 fusion who has back and leg pain from likely foraminal stenosis at L5-S1 as well as symptoms related to his flat back syndrome.  Given this, he was brought to the operating room for revision of a previous fusion, L5-S1 transforaminal interbody fusion, and Smith-Olivia osteotomy. Please note that Gumaro Neil PA-C's assistance was needed for positioning, retraction, suctioning, and closure.     DESCRIPTION OF PROCEDURE:  The patient was brought to the operating room, general endotracheal anesthesia was induced.  The patient was rolled in the prone position on the Des 4-poster table.  The back was prepped and draped in sterile fashion.  A midline exposure was performed and the previous screws from L3-L5 were identified and removed.  Down the same screw tracts, bilateral pedicle screws were placed using the Medtronic Solera system.  The navigation frame was placed.  The O-arm was sterilely draped and brought into the field and used for 3-dimensional navigation imaging.  Bilateral S1 pedicle screws were placed using the Medtronic Solera system as well.  Then, bilateral S2 alar iliac pelvic  instrumentation was placed using the Seafile ballast system.  A post-placement spin was obtained and imaging demonstrated the screws to be in good position.  At this point, the screw based distractor was placed bilaterally at L5-S1, the facets were resected as well as the ligament, opening the foramen, decompressing the nerve, and also allowing for correction of the deformity.  The disk space was identified and opened and turn and rotate distractors used to dilate up the disk space.  Once this was done, 18 degree Capstone control trials were used and incised and then finally bilateral 16 mm x 18 degree x 22 mm cages were placed bilaterally and local autograft was also placed in the cages, as well as around the cages in the disk space.  Rods were sized and seated.  Compression was applied across L5-S1 and the gabriel secured with the set screws.  X-ray confirmed the hardware to be in good position.  The screws were final tightened.  Posterior elements were decorticated and using a combination of the local autograft and 30 mL of autograft, bone chips were placed posteriorly.  Once this was done, 1 gram of vancomycin powder was placed in the wound.  A medium Hemovac drain was placed in the subfascial space.  The wound was closed with 0 interrupted Vicryl for the fascial layer, 2-0 inverted interrupted Vicryl for the subcutaneous layer and 3-0 nylon for skin.  The patient was awakened, extubated and taken to the recovery room in good condition.           RADHA ESTEBAN MD             D: 2020   T: 2020   MT: WT      Name:     ZINA OVALLE   MRN:      [id number]        Account:        [id number]   :      1965           Procedure Date: 2020      Document: [id number]

## 2020-01-02 NOTE — ANESTHESIA POSTPROCEDURE EVALUATION
Patient: Doni Gallardo    Procedure(s):  REMOVAL OF PREVIOUS L3-L5 POSTERIOR SEGMENTAL INSTRUMENTATION; REINSERTION OF INSTRUMENTATION L3-S1; PELVIC INSTRUMENTATION WITH STEALTH NAVIGATION; L5-S1 BILATERAL TRANSFORAMINAL INTERBODY FUSION AND MANUEL PISANO OSTEOTOMY    Diagnosis:Spinal stenosis of lumbar region with radiculopathy [M48.061, M54.16]  Flat back syndrome, acquired [M40.30]  Diagnosis Additional Information: No value filed.    Anesthesia Type:  General, ETT    Note:  Anesthesia Post Evaluation    Patient location during evaluation: PACU  Patient participation: Able to fully participate in evaluation  Level of consciousness: awake  Pain management: adequate  Airway patency: patent  Cardiovascular status: acceptable  Respiratory status: acceptable  Hydration status: acceptable  PONV: controlled     Anesthetic complications: None          Last vitals:  Vitals:    01/02/20 1209 01/02/20 1210 01/02/20 1220   BP:  111/67 (!) 83/51   Pulse:  78 83   Resp: 13 12 10   Temp:      SpO2: 97% 99% 96%         Electronically Signed By: Ramo Mai MD  January 2, 2020  12:25 PM

## 2020-01-02 NOTE — PROGRESS NOTES
Medication History Completed by Medication Scribe  Admission medication history interview status for the 1/2/2020  admission is complete. See EPIC admission navigator for prior to admission medications     Medication history sources: Patient, Patient's family/friend (Wife), Surescripts and H&P  Medication history source reliability: Good  Adherence assessment: Good    Significant changes made to the medication list:  None      Additional medication history information:   None    Medication reconciliation completed by provider prior to medication history? No    Time spent in this activity: 30 minutes      Prior to Admission medications    Medication Sig Last Dose Taking? Auth Provider   atenolol (TENORMIN) 50 MG tablet Take 25 mg by mouth daily (Takes 0.5 X 50 mg = 25 mg dose) 1/2/2020 at 0330 Yes Reported, Patient   Cyanocobalamin (B-12 PO) Take 1 Dose by mouth daily as needed (leg cramps)  Over 1 month ago at prn Yes Reported, Patient   losartan-hydrochlorothiazide (HYZAAR) 100-25 MG tablet Take 1 tablet by mouth daily  1/1/2020 at am Yes Reported, Patient   multivitamin w/minerals (MULTI-VITAMIN) tablet Take 1 tablet by mouth daily Over 1 week ago at am Yes Reported, Patient   naproxen sodium (ANAPROX) 220 MG tablet Take 440-660 mg by mouth daily as needed for moderate pain 12/29/2019 at am Yes Reported, Patient   Omega-3 Fatty Acids (FISH OIL) 1200 MG capsule Take 2,400 mg by mouth At Bedtime 12/30/2019 at hs Yes Reported, Patient   omeprazole (PRILOSEC) 20 MG DR capsule Take 20 mg by mouth daily  1/2/2020 at 0330 Yes Reported, Patient   traMADol (ULTRAM) 50 MG tablet Take  mg by mouth every 6 hours as needed for severe pain  1/1/2020 at 1600 Yes Reported, Patient

## 2020-01-02 NOTE — ANESTHESIA PREPROCEDURE EVALUATION
Anesthesia Pre-Procedure Evaluation    Patient: Doni Gallardo   MRN: [id number] : 1965          Preoperative Diagnosis: Spinal stenosis of lumbar region with radiculopathy [M48.061, M54.16]  Flat back syndrome, acquired [M40.30]    Procedure(s):  Removal of previous Lumbar 3-5 posterior segmental instrumentation. Reinsertion of instrumentation Lumbar 3-Sacral 1. Pelvic Instrumentation. Lumbar 5- Sacral 1 bilateral transforaminal interbody fusion and smith calix osteotomy. Posterior arthrodesis Lumbar 3-Sacral 1( MEDTRONIC, STEALTH, O-ARM, TRIOS,MEDTRONIC SOLERA In situ hardware unknown)^^    Past Medical History:   Diagnosis Date     Eczema      Gastroesophageal reflux disease      Gout      Hyperglyceridemia      Hypertension      OA (osteoarthritis of spine)      Osteoarthritis of knees, bilateral      Past Surgical History:   Procedure Laterality Date     APPENDECTOMY       BACK SURGERY      Lumbar Fusion     BACK SURGERY  2017    Lumbar Fusion     COLONOSCOPY  2017     ENT SURGERY      Nasal septal sinus surgery     ORTHOPEDIC SURGERY      Knee arthroscopy       Anesthesia Evaluation     . Pt has had prior anesthetic.     History of anesthetic complications (severe skin reaction to tape on his face in the past)          ROS/MED HX    ENT/Pulmonary:      (-) sleep apnea   Neurologic:       Cardiovascular:     (+) hypertension-range: controlled, ---. : . . . :. .       METS/Exercise Tolerance:     Hematologic:         Musculoskeletal:   (+)  other musculoskeletal- spine djd      GI/Hepatic:     (+) GERD Asymptomatic on medication,       Renal/Genitourinary:         Endo:         Psychiatric:         Infectious Disease:         Malignancy:         Other:                          Physical Exam  Normal systems: cardiovascular, pulmonary and dental    Airway   Mallampati: II  TM distance: >3 FB  Neck ROM: full    Dental     Cardiovascular       Pulmonary             Lab Results   Component Value  "Date    WBC 17.3 (H) 01/16/2005    HGB 15.9 01/16/2005    HCT 45.7 01/16/2005     01/16/2005     01/16/2005    POTASSIUM 3.4 01/02/2020    CHLORIDE 99 01/16/2005    CO2 26 01/16/2005    BUN 14 01/16/2005    CR 1.00 01/16/2005    GLC 89 01/16/2005    ALYSON 10.4 01/16/2005       Preop Vitals  BP Readings from Last 3 Encounters:   01/02/20 100/79   11/25/19 103/74   07/15/19 128/89    Pulse Readings from Last 3 Encounters:   01/02/20 76   11/25/19 92   07/15/19 80      Resp Readings from Last 3 Encounters:   01/02/20 16    SpO2 Readings from Last 3 Encounters:   01/02/20 98%   11/25/19 99%   07/15/19 98%      Temp Readings from Last 1 Encounters:   01/02/20 36.8  C (98.2  F) (Temporal)    Ht Readings from Last 1 Encounters:   01/02/20 1.803 m (5' 11\")      Wt Readings from Last 1 Encounters:   01/02/20 93.9 kg (207 lb)    Estimated body mass index is 28.87 kg/m  as calculated from the following:    Height as of this encounter: 1.803 m (5' 11\").    Weight as of this encounter: 93.9 kg (207 lb).       Anesthesia Plan      History & Physical Review  History and physical reviewed and following examination; no interval change.    ASA Status:  2 .    NPO Status:  > 8 hours    Plan for General and ETT with Intravenous induction. Maintenance will be Balanced.    PONV prophylaxis:  Ondansetron (or other 5HT-3) and Dexamethasone or Solumedrol  Additional equipment: 2nd IV 2nd PIV after asleep  T&S  No silk tape for ETT      Postoperative Care  Postoperative pain management:  IV analgesics.      Consents  Anesthetic plan, risks, benefits and alternatives discussed with:  Patient..                 Ramo Mai MD  "

## 2020-01-02 NOTE — BRIEF OP NOTE
[facility]    Brief Operative Note    Pre-operative diagnosis: Spinal stenosis of lumbar region with radiculopathy [M48.061, M54.16]  Flat back syndrome, acquired [M40.30]  Post-operative diagnosis Same as pre-operative diagnosis    Procedure: Procedure(s):  REMOVAL OF PREVIOUS L3-L5 POSTERIOR SEGMENTAL INSTRUMENTATION; REINSERTION OF INSTRUMENTATION L3-S1; PELVIC INSTRUMENTATION WITH STEALTH NAVIGATION; L5-S1 BILATERAL TRANSFORAMINAL INTERBODY FUSION AND MANUEL PISANO OSTEOTOMY  Surgeon: Surgeon(s) and Role:     * Satish Camara MD - Primary     * Gumaro Neil PA-C - Assisting  Anesthesia: General   Estimated blood loss: 725 mL  Drains: None  Specimens: * No specimens in log *  Findings:   None.  Complications: None.  Implants:   Implant Name Type Inv. Item Serial No.  Lot No. LRB No. Used   LUMBAR SPINE EXPLANTS (6 SCREWS, 6 SET SCREWS, 2 RODS)      N/A 14   IMP CAPSTONE CONTROL INTERBODY FUSION DEVICE 16MM X 22MM X 18DEG    MEDTRONIC 14GE N/A 1   IMP CAPSTONE CONTROL INTERBODY FUSION DEVICE 16MM X 22MM X 18DEG    MEDTRONIC 15GX N/A 1   GRAFT BONE CRUSH CANC 30ML [id number] Bone/Tissue/Biologic GRAFT BONE CRUSH CANC 30ML [id number] MUSCULOSKELETAL ORTIZ  N/A 1   IMP SCR MEDT 5.5/6.0MM SOLERA 7.5X55MM MA [id number] Metallic Hardware/Parker IMP SCR MEDT 5.5/6.0MM SOLERA 7.5X55MM MA [id number]  MEDTRONIC INC 31DEC 2019 41 07 N/A 6   IMP SCR MEDT 5.5/6.0MM SOLERA 7.5X45MM MA [id number] Metallic Hardware/Parker IMP SCR MEDT 5.5/6.0MM SOLERA 7.5X45MM MA [id number]  MEDTRONIC INC 31DEC 2019 41 07 N/A 2   IMP SOLERA 500MM STRAIGHT COBALT CHROME GRAY    MEDTRONIC 02JAN 2020 41 02 N/A 1   IMP SCR SET MEDT SOLERA BREAK OFF 5.5MM TI [id number] Metallic Hardware/Parker IMP SCR SET MEDT SOLERA BREAK OFF 5.5MM TI [id number]  MEDTRONIC INC 31DEC 2019 41 07 N/A 10   IMP BALLAST 9.5MM X 100MM SCREWS    MEDTRONIC 31DEC 2019 41 06 N/A 2     [id number]

## 2020-01-03 ENCOUNTER — APPOINTMENT (OUTPATIENT)
Dept: PHYSICAL THERAPY | Facility: CLINIC | Age: 55
DRG: 455 | End: 2020-01-03
Attending: NEUROLOGICAL SURGERY
Payer: COMMERCIAL

## 2020-01-03 LAB
GLUCOSE BLDC GLUCOMTR-MCNC: 87 MG/DL (ref 70–99)
HGB BLD-MCNC: 9.5 G/DL (ref 13.3–17.7)

## 2020-01-03 PROCEDURE — 97161 PT EVAL LOW COMPLEX 20 MIN: CPT | Mod: GP

## 2020-01-03 PROCEDURE — 25000132 ZZH RX MED GY IP 250 OP 250 PS 637: Performed by: NURSE PRACTITIONER

## 2020-01-03 PROCEDURE — 25000132 ZZH RX MED GY IP 250 OP 250 PS 637: Performed by: PHYSICIAN ASSISTANT

## 2020-01-03 PROCEDURE — 12000000 ZZH R&B MED SURG/OB

## 2020-01-03 PROCEDURE — 85018 HEMOGLOBIN: CPT | Performed by: PHYSICIAN ASSISTANT

## 2020-01-03 PROCEDURE — 00000146 ZZHCL STATISTIC GLUCOSE BY METER IP

## 2020-01-03 PROCEDURE — 36415 COLL VENOUS BLD VENIPUNCTURE: CPT | Performed by: PHYSICIAN ASSISTANT

## 2020-01-03 PROCEDURE — 25000132 ZZH RX MED GY IP 250 OP 250 PS 637: Performed by: NEUROLOGICAL SURGERY

## 2020-01-03 PROCEDURE — 25000128 H RX IP 250 OP 636: Performed by: NURSE PRACTITIONER

## 2020-01-03 PROCEDURE — 97116 GAIT TRAINING THERAPY: CPT | Mod: GP

## 2020-01-03 PROCEDURE — 97530 THERAPEUTIC ACTIVITIES: CPT | Mod: GP

## 2020-01-03 RX ADMIN — HYDROMORPHONE HYDROCHLORIDE 4 MG: 2 TABLET ORAL at 04:03

## 2020-01-03 RX ADMIN — OMEPRAZOLE 20 MG: 20 CAPSULE, DELAYED RELEASE ORAL at 09:20

## 2020-01-03 RX ADMIN — SENNOSIDES AND DOCUSATE SODIUM 2 TABLET: 8.6; 5 TABLET ORAL at 09:20

## 2020-01-03 RX ADMIN — Medication 1 LOZENGE: at 06:52

## 2020-01-03 RX ADMIN — HYDROMORPHONE HYDROCHLORIDE 4 MG: 2 TABLET ORAL at 00:17

## 2020-01-03 RX ADMIN — METHOCARBAMOL TABLETS 750 MG: 750 TABLET, COATED ORAL at 01:32

## 2020-01-03 RX ADMIN — ACETAMINOPHEN 975 MG: 325 TABLET, FILM COATED ORAL at 00:17

## 2020-01-03 RX ADMIN — HYDROMORPHONE HYDROCHLORIDE 0.5 MG: 1 INJECTION, SOLUTION INTRAMUSCULAR; INTRAVENOUS; SUBCUTANEOUS at 01:32

## 2020-01-03 RX ADMIN — HYDROMORPHONE HYDROCHLORIDE 4 MG: 2 TABLET ORAL at 07:05

## 2020-01-03 RX ADMIN — ATENOLOL 25 MG: 25 TABLET ORAL at 09:20

## 2020-01-03 RX ADMIN — HYDROMORPHONE HYDROCHLORIDE 4 MG: 2 TABLET ORAL at 20:12

## 2020-01-03 RX ADMIN — SENNOSIDES AND DOCUSATE SODIUM 2 TABLET: 8.6; 5 TABLET ORAL at 20:12

## 2020-01-03 RX ADMIN — HYDROMORPHONE HYDROCHLORIDE 4 MG: 2 TABLET ORAL at 10:13

## 2020-01-03 RX ADMIN — ACETAMINOPHEN 975 MG: 325 TABLET, FILM COATED ORAL at 14:27

## 2020-01-03 RX ADMIN — ACETAMINOPHEN 975 MG: 325 TABLET, FILM COATED ORAL at 06:23

## 2020-01-03 RX ADMIN — SIMETHICONE CHEW TAB 80 MG 80 MG: 80 TABLET ORAL at 00:21

## 2020-01-03 RX ADMIN — HYDROMORPHONE HYDROCHLORIDE 4 MG: 2 TABLET ORAL at 13:20

## 2020-01-03 RX ADMIN — HYDROMORPHONE HYDROCHLORIDE 4 MG: 2 TABLET ORAL at 16:57

## 2020-01-03 RX ADMIN — LOSARTAN POTASSIUM AND HYDROCHLOROTHIAZIDE 1 TABLET: 100; 25 TABLET, FILM COATED ORAL at 09:21

## 2020-01-03 ASSESSMENT — ACTIVITIES OF DAILY LIVING (ADL)
ADLS_ACUITY_SCORE: 13

## 2020-01-03 ASSESSMENT — MIFFLIN-ST. JEOR: SCORE: 1868.19

## 2020-01-03 NOTE — PLAN OF CARE
Discharge Planner PT   Patient plan for discharge: Home with family   Current status: PT eval completed, treatment initiated. Pt is a 54 yr old male admitted for removal and replacement of previous L3-L5 instrumentation, bilateral L5-S1 transforaminal interbody fusion and Paul-Olivia osteotomy, POD 1. Pt lives with spouse and adult sons in a 1SH with no MACY. Pt was ind with all ADL's, IADL's and ambulation at baseline.  Has no DME's.  Pt is edu on spinal precautions and log rolling technique. Pt required min assist x  1 with supine>sit. Pt sat at EOB x 5 min independently. Stood with CGA and ambulated 200 ft using RW and CGA progressing to SBA. Pt stood by the sink and brushed his teeth with supervision.    Barriers to return to prior living situation: None anticipated.   Recommendations for discharge: Home with assist of family for heavy household tasks due to spinal precautions. Pt may need supervision with ambulation until independence is achieved.   Rationale for recommendations: Pt is anticipated to make progress in all functional mobility with continued acute skilled  PT interventions.        Entered by: Skylar Hazel 01/03/2020 12:56 PM

## 2020-01-03 NOTE — PROGRESS NOTES
[facility]    Neurosurgery Progress Note    Date of Service (when I saw the patient): 01/03/2020     Assessment & Plan   Doni Gallardo is a 54 year old male who was admitted on 1/2/2020. He presented with a previous L3-5 fusion with back and leg pain from likely foraminal stenosis at L5-S1 as well as symptoms related to flat back syndrome. On 1/2/2020 he underwent a L3-5 removal of hardware, reinsertion of instrumentation L3-S1 with pelvic instrumentation and L5-S1 bilateral TLIF and SPO. Today he was seen lying in bed. He reports low back pain. He denies radicular pain, paresthesias, and overt weakness. He was able to sit in the chair overnight. He reports feeling bloated. Per RN, bowel sounds hypoactive. No flatus yet. Hemovac x1 with 90 out overnight.     Active Problems:    Spinal stenosis of lumbar region with radiculopathy    Assessment:  L3-5 removal of hardware, reinsertion of instrumentation L3-S1 with pelvic instrumentation and L5-S1 bilateral TLIF and SPO    Plan:   -Continue oral pain management  -PT/OT and ambulation  -Discontinue nino when OOB  -Continue HADLEY for now  -Miralax ordered  -Per Dr. Camara, no need for antibiotics when drain in    I have discussed the following assessment and plan Dr. Camara who is in agreement with initial plan and will follow up with further consultation recommendations.    Sinai Ruelas, [facility] Neurosurgery  [address]  [address] [place] [address]    Tel [phone]  Pager [phone]      Interval History   Stable.    Physical Exam   Temp: 98.6  F (37  C) Temp src: Oral BP: 109/71 Pulse: 92 Heart Rate: 92 Resp: 17 SpO2: 98 % O2 Device: Nasal cannula Oxygen Delivery: 1.5 LPM  Vitals:    01/02/20 0615 01/03/20 0605   Weight: 207 lb (93.9 kg) 224 lb (101.6 kg)     Vital Signs with Ranges  Temp:  [97.3  F (36.3  C)-98.6  F (37  C)] 98.6  F (37  C)  Pulse:  [78-92] 92  Heart Rate:  []  "92  Resp:  [10-19] 17  BP: ()/(51-90) 109/71  SpO2:  [91 %-100 %] 98 %  I/O last 3 completed shifts:  In: 4450 [P.O.:1200; I.V.:3000]  Out: 4195 [Urine:3100; Drains:370; Blood:725]    Heart Rate: 92, Blood pressure 109/71, pulse 92, temperature 98.6  F (37  C), temperature source Oral, resp. rate 17, height 5' 11\" (1.803 m), weight 224 lb (101.6 kg), SpO2 98 %.  224 lbs 0 oz  HEENT:  Normocephalic.  PERRLA.    Heart:  No peripheral edema  Lungs:  No SOB  Skin:  Warm and dry, good capillary refill. Incision intact with sutures, no drainage. JPx1 intact.  Extremities:  Good radial and dorsalis pedis pulses bilaterally, no edema, cyanosis or clubbing.    NEUROLOGICAL EXAMINATION:   Mental status:  Alert and Oriented x 3, speech is fluent.  Cranial nerves:  II-XII intact.   Motor:     Hip Flexor:                Right: 5/5  Left:  5/5  Hip Adductor:             Right:  5/5  Left:  5/5  Hip Abductor:             Right:  5/5  Left:  5/5  Gastroc Soleus:        Right:  5/5  Left:  5/5  Tib/Ant:                      Right:  5/5  Left:  5/5  EHL:                     Right:  5/5  Left:  5/5  Sensation:  intact    Medications     sodium chloride Stopped (01/03/20 0315)     tranexamic acid         acetaminophen  975 mg Oral Q8H     acetaminophen  975 mg Oral Q8H     atenolol  25 mg Oral Daily     fish oil-omega-3 fatty acids  2,000 mg Oral At Bedtime     losartan-hydrochlorothiazide  1 tablet Oral Daily     multivitamin w/minerals  1 tablet Oral Daily     omeprazole  20 mg Oral Daily     senna-docusate  1 tablet Oral BID    Or     senna-docusate  2 tablet Oral BID     sodium chloride (PF)  3 mL Intracatheter Q8H     cholecalciferol  2,000 Units Oral Daily       Data     CBC RESULTS:   Recent Labs   Lab Test 01/03/20  0846   HGB 9.5*     Basic Metabolic Panel:  Lab Results   Component Value Date     01/16/2005      Lab Results   Component Value Date    POTASSIUM 3.4 01/02/2020     Lab Results   Component Value Date    " CHLORIDE 99 01/16/2005     Lab Results   Component Value Date    ALYSON 10.4 01/16/2005     Lab Results   Component Value Date    CO2 26 01/16/2005     Lab Results   Component Value Date    BUN 14 01/16/2005     Lab Results   Component Value Date    CR 1.00 01/16/2005     Lab Results   Component Value Date    GLC 89 01/16/2005     INR:  No results found for: INR

## 2020-01-03 NOTE — PROGRESS NOTES
01/03/20 0900   Quick Adds   Type of Visit Initial PT Evaluation   Living Environment   Lives With spouse   Living Arrangements house   Home Accessibility no concerns   Transportation Anticipated family or friend will provide   Living Environment Comment Pt lives with spouse and adult sons in a 1Sh with no MACY.   Self-Care   Usual Activity Tolerance good   Current Activity Tolerance moderate   Regular Exercise No   Equipment Currently Used at Home none   Activity/Exercise/Self-Care Comment Pt was ind with all ADL's and iADL's   Functional Level Prior   Ambulation 0-->independent   Transferring 0-->independent   Toileting 0-->independent   Bathing 0-->independent   Communication 0-->understands/communicates without difficulty   Swallowing 0-->swallows foods/liquids without difficulty   Cognition 0 - no cognition issues reported   Fall history within last six months no   Which of the above functional risks had a recent onset or change? none   Prior Functional Level Comment Pt was ind with ambulation,    General Information   Onset of Illness/Injury or Date of Surgery - Date 01/03/19   Referring Physician Gumaro Neil PA-C   Patient/Family Goals Statement Go home   Pertinent History of Current Problem (include personal factors and/or comorbidities that impact the POC) Pt is a 54yr old male admitted for Removal and replacement of previous L3-L5 instrumentation, and Bilateral L5-S1 transforaminal interbody fusion and Paul-Olivia osteotomy, POD 1.    Precautions/Limitations fall precautions;spinal precautions   Weight-Bearing Status - LLE full weight-bearing   Weight-Bearing Status - RLE full weight-bearing   General Observations Pleasant and cooperative   General Info Comments Activity: Up with assist, Ambulate    Cognitive Status Examination   Orientation orientation to person, place and time   Level of Consciousness alert   Follows Commands and Answers Questions 100% of the time   Personal Safety and  "Judgment intact   Memory intact   Pain Assessment   Patient Currently in Pain Yes, see Vital Sign flowsheet  (5/10 at the incision, RN informed.)   Integumentary/Edema   Integumentary/Edema Comments HADLEY drain   Range of Motion (ROM)   ROM Comment BLE: WFL   Strength   Strength Comments B LE: 4/5   Bed Mobility   Bed Mobility Comments Supine>sit: min assist x  and verbal cues on log rolling   Transfer Skills   Transfer Comments STS at CGA   Gait   Gait Comments 20 ft using RW and CGA   Balance   Balance Comments Sitting:  Good   Sensory Examination   Sensory Perception no deficits were identified   Coordination   Coordination no deficits were identified   Muscle Tone   Muscle Tone no deficits were identified   Modality Interventions   Planned Modality Interventions Cryotherapy   General Therapy Interventions   Planned Therapy Interventions balance training;bed mobility training;gait training;strengthening;transfer training;risk factor education;home program guidelines;progressive activity/exercise   Clinical Impression   Criteria for Skilled Therapeutic Intervention yes, treatment indicated   PT Diagnosis impaired gait   Influenced by the following impairments decreased activity tolerance and pain   Functional limitations due to impairments assistance needed with mobility   Clinical Presentation Stable/Uncomplicated   Clinical Presentation Rationale clinical judgement, [facility]   Clinical Decision Making (Complexity) Low complexity   Therapy Frequency 2x/day   Predicted Duration of Therapy Intervention (days/wks) 7   Anticipated Discharge Disposition Home with Assist   Risk & Benefits of therapy have been explained Yes   Patient, Family & other staff in agreement with plan of care Yes   Clinical Impression Comments Pt presents with pain and decreased activiy tolernace limiting independence with fucntional mobility. Pt will benefit from continued skilled PT to achieve PLOF and independence.    [facility] AM-PAC  \"6 " "Clicks\" V.2 Basic Mobility Inpatient Short Form   1. Turning from your back to your side while in a flat bed without using bedrails? 3 - A Little   2. Moving from lying on your back to sitting on the side of a flat bed without using bedrails? 3 - A Little   3. Moving to and from a bed to a chair (including a wheelchair)? 3 - A Little   4. Standing up from a chair using your arms (e.g., wheelchair, or bedside chair)? 3 - A Little   5. To walk in hospital room? 3 - A Little   6. Climbing 3-5 steps with a railing? 3 - A Little   Basic Mobility Raw Score (Score out of 24.Lower scores equate to lower levels of function) 18   Total Evaluation Time   Total Evaluation Time (Minutes) 10     "

## 2020-01-03 NOTE — PLAN OF CARE
Vital signs stable, lung sounds clear, bowel sounds stable, incision dry and intact, open to air.  Escamilla put out 800 ml. Was removed around 1200.  Drinking fluids well, cms and neuro checks intact.  Pain is well controlled with Dilaudid.  Ambulated several times this morning.  Has a HADLEY.

## 2020-01-03 NOTE — PLAN OF CARE
AxO x4. VSS, 1L O2. Pain managed with PRN Dilaudid - PO and IV, PRN Robaxin, and scheduled Tylenol. Denies nausea. Neuros intact. CMS intact. Tolerating clear liquid diet. Bowel sounds hypoactive. Assist x1 with GB. Incision IVAN, Hemovac CDI, patent. SL. Escamilla patent, good UO. Discharge pending progress.

## 2020-01-04 ENCOUNTER — APPOINTMENT (OUTPATIENT)
Dept: PHYSICAL THERAPY | Facility: CLINIC | Age: 55
DRG: 455 | End: 2020-01-04
Attending: NEUROLOGICAL SURGERY
Payer: COMMERCIAL

## 2020-01-04 ENCOUNTER — APPOINTMENT (OUTPATIENT)
Dept: GENERAL RADIOLOGY | Facility: CLINIC | Age: 55
DRG: 455 | End: 2020-01-04
Attending: PHYSICIAN ASSISTANT
Payer: COMMERCIAL

## 2020-01-04 VITALS
DIASTOLIC BLOOD PRESSURE: 90 MMHG | BODY MASS INDEX: 31.36 KG/M2 | HEIGHT: 71 IN | OXYGEN SATURATION: 98 % | SYSTOLIC BLOOD PRESSURE: 132 MMHG | HEART RATE: 92 BPM | WEIGHT: 224 LBS | TEMPERATURE: 99.6 F | RESPIRATION RATE: 13 BRPM

## 2020-01-04 PROCEDURE — 97116 GAIT TRAINING THERAPY: CPT | Mod: GP

## 2020-01-04 PROCEDURE — 25000132 ZZH RX MED GY IP 250 OP 250 PS 637: Performed by: PHYSICIAN ASSISTANT

## 2020-01-04 PROCEDURE — 72100 X-RAY EXAM L-S SPINE 2/3 VWS: CPT

## 2020-01-04 PROCEDURE — 40000894 ZZH STATISTIC OT IP EVAL DEFER

## 2020-01-04 RX ORDER — AMOXICILLIN 250 MG
1 CAPSULE ORAL 2 TIMES DAILY PRN
Qty: 60 TABLET | Refills: 0 | Status: SHIPPED | OUTPATIENT
Start: 2020-01-04

## 2020-01-04 RX ORDER — METHOCARBAMOL 750 MG/1
750 TABLET, FILM COATED ORAL EVERY 6 HOURS PRN
Qty: 50 TABLET | Refills: 0 | Status: SHIPPED | OUTPATIENT
Start: 2020-01-04 | End: 2020-02-17

## 2020-01-04 RX ORDER — HYDROMORPHONE HYDROCHLORIDE 2 MG/1
2-4 TABLET ORAL EVERY 4 HOURS PRN
Qty: 50 TABLET | Refills: 0 | Status: SHIPPED | OUTPATIENT
Start: 2020-01-04 | End: 2020-01-20

## 2020-01-04 RX ADMIN — OMEGA-3 FATTY ACIDS CAP 1000 MG 2000 MG: 1000 CAP at 00:47

## 2020-01-04 RX ADMIN — OMEPRAZOLE 20 MG: 20 CAPSULE, DELAYED RELEASE ORAL at 06:40

## 2020-01-04 RX ADMIN — SENNOSIDES AND DOCUSATE SODIUM 2 TABLET: 8.6; 5 TABLET ORAL at 10:01

## 2020-01-04 RX ADMIN — MELATONIN 2000 UNITS: at 10:01

## 2020-01-04 RX ADMIN — MULTIPLE VITAMINS W/ MINERALS TAB 1 TABLET: TAB at 10:01

## 2020-01-04 RX ADMIN — HYDROMORPHONE HYDROCHLORIDE 2 MG: 2 TABLET ORAL at 00:46

## 2020-01-04 RX ADMIN — HYDROMORPHONE HYDROCHLORIDE 2 MG: 2 TABLET ORAL at 03:45

## 2020-01-04 RX ADMIN — ATENOLOL 25 MG: 25 TABLET ORAL at 06:40

## 2020-01-04 RX ADMIN — HYDROMORPHONE HYDROCHLORIDE 2 MG: 2 TABLET ORAL at 10:02

## 2020-01-04 RX ADMIN — LOSARTAN POTASSIUM AND HYDROCHLOROTHIAZIDE 1 TABLET: 100; 25 TABLET, FILM COATED ORAL at 10:01

## 2020-01-04 RX ADMIN — ACETAMINOPHEN 975 MG: 325 TABLET, FILM COATED ORAL at 00:46

## 2020-01-04 RX ADMIN — ACETAMINOPHEN 975 MG: 325 TABLET, FILM COATED ORAL at 06:39

## 2020-01-04 RX ADMIN — HYDROMORPHONE HYDROCHLORIDE 2 MG: 2 TABLET ORAL at 06:39

## 2020-01-04 ASSESSMENT — ACTIVITIES OF DAILY LIVING (ADL)
ADLS_ACUITY_SCORE: 13

## 2020-01-04 NOTE — PROGRESS NOTES
Discharge instructions reviewed w/ pt.  Discharge meds given to pt.   No further questions at this time.  Belongings packed by pt/pt's wife.  Pt up independently.  Pain controlled w/ oral pain meds.  Discharged to home via wheelchair, transport by pt's wife.

## 2020-01-04 NOTE — PLAN OF CARE
Discharge Planner PT   Patient plan for discharge: Home with family   Current status: Pt was received supine in bed. Mod I with supine<>sit with bed flat and no rails. Independent with STS and ambulated 500 ft without assistive devices and independent.     Barriers to return to prior living situation: None anticipated.   Recommendations for discharge: Home with assist of family for heavy household tasks due to spinal precautions.  Rationale for recommendations: Pt has met established PT goals. Pt will continue to need assist with heavy household tasks involving lifting due to spinal precautions.        Entered by: Skylar Hazel 01/04/2020 10:05 AM     Physical Therapy Discharge Summary    Reason for therapy discharge:    Discharged to home.    Progress towards therapy goal(s). See goals on Care Plan in [facility] electronic health record for goal details.  Goals met    Therapy recommendation(s):    No further therapy is recommended.

## 2020-01-04 NOTE — DISCHARGE INSTRUCTIONS
Spine and [facility] at [facility]  Dr. Ashraf Discharge Instructions Following Spine Surgery  [phone]  Monday - Friday; 8:00 AM - 4:00 PM    In General:   After you have had surgery on your spine, remember do not twist, or excessively flex or extend the area that you had surgery.  These activities can prevent healing.  Pain is normal and to be expected following surgery.  Please call our office to schedule your appointment follow up appointment.      Bowel Care:  Many people have constipation (hard stools) after surgery.  To help prevent constipation: Drink plenty of fluid (8-10 glasses/day); Eat more fiber, such as whole grain bread, bran cereal, and fruits and vegetables; Stay active by walking; Over the counter stool softener may also help.      Medications:  Spine surgery and pain management is unique to all patients.  You will generally be given medications for pain, muscle spasms or tightness, and for constipation during the immediate post op period.  It is important that you use these as prescribed.  Please remember to bring your pill bottles to all of your appointments. Avoid alcoholic beverages while taking narcotic pain medications. You can use ice to areas of pain as needed, 20 minutes at a time.  Changing positions and walking will help loosen your muscles as well.    Driving:  No driving while on narcotic pain medications.  It is state law not to drive while under the influence of a drug to a degree which renders you incapable of safely driving.  The narcotic medication you will be taking after surgery falls under this category.     Activity:   After surgery, most people feel less pain than they have had in a long time.  Walking and light activities will help you regain the use of your muscles.  You are encouraged to walk: start with short walks 5-10 minutes at a time for 4-5 times per day and increase as tolerated.  Stair climbing as tolerated, we recommend you use the railing.  No lifting greater than 10 pounds: approximately equal to one gallon of milk. No twisting, bending in the area you have had surgery. No housework, vacuuming, laundry, leaf raking, lawn mowing, or snow removal. Wear your brace (if ordered) as directed.    Showers:  If you have sutures or staples you may shower two days after surgery. It is ok to let water run over your incision but do not touch or scrub on the incision. Pat dry immediately after showering. If there is a dressing in place, you may remove it 2 days after surgery. If you were closed with Derma manzano (glue), you may shower without covering the incision. No baths, hot tubs, or pool activity for at least 6 weeks.     Nutrition:  In general, your diet restrictions will not change with your surgery.  You may need to eat small frequent meals initially until your appetite returns.  Eat plenty of high fiber foods and drink plenty of fluids. If you do not have a fluid restriction from or prior to surgery, we recommend 6-8 (8oz) glasses of water per day. Other fluids are fine, but water is best. Nausea is not uncommon; it is a common side effect to many pain medications.  We recommend that you take the pain medications with food, if this does not improve your symptoms, please call us.     Smoking:  For proper healing it is required that you quit using all tobacco products.  This includes smoking, chewing, nicotine gums, and nicotine patches.  Call Dr. Ashraf if these occur: Drainage from your incision, increased pain/redness/swelling, temperatures greater than 101.5, increased leg pain or swelling or unrelieved headaches    Go to the nearest Emergency Room if you experience: chest pain, shortness of breath, neck swelling or swallowing problems

## 2020-01-04 NOTE — PLAN OF CARE
POD 1 from a L3-L5 hardware removal and reinsertion of instrumentation L3-S1. A&O x 4. CMS intact. Bowel sounds active, passing flatus, tolerating regular diet. No bm. Gave bedtime senna. VSS. Dressing on back at drain site CDI. Staples at back open to air CDI. Hemovac o/p 60cc. Up independently in room. Voiding adequately. Bladder scanned 31cc. C/o back pain, decreased with dilaudid. Pt scoring green on the Aggression Stop Light Tool. Plan discharge home pending.

## 2020-01-04 NOTE — PLAN OF CARE
Discharge Planner OT   Patient plan for discharge: home w/ family  Current status: OT orders received and chart reviewed. Pt knows spinal precautions and techniques for ADL's/mobility from prior surgeries. Pt demonstrated ind w/ bed mobility, transfers, donning/doffing socks while maintaining spinal precautions. Pt verbalized good understanding of techniques to complete low item retrieval, g/h, car transfers while maintaining spinal precautions. Pt has a walk-in shower and knows where to purchase LH sponge if needed. Pt states his wife can assist him as needed at home.     No skilled IP OT needs identified. Will complete OT order.  Barriers to return to prior living situation: none  Recommendations for discharge: home w/ A from family for heavier household tasks 2' spinal precautions  Rationale for recommendations: Pt mobilizing and completing ADL's independently while maintaining spinal precautions. No skilled OT needs. Anticipate pt will safely return home w./ above A from wife        Entered by: Mercedes Trevino 01/04/2020 10:30 AM

## 2020-01-04 NOTE — PLAN OF CARE
POD 2 AxO x4. VSS,  Pain managed with PRN Dilaudid  and scheduled Tylenol. Denies nausea. Neuros intact. CMS intact. Tolerating regular Diet. Bowel sounds active passing gas. . Incision IVAN, Hemovac patent.Up indepentent possible discharge to home today

## 2020-01-05 NOTE — DISCHARGE SUMMARY
Admit Date:     01/02/2020   Discharge Date:     01/04/2020      PRIMARY DIAGNOSIS:  Spinal stenosis of the lumbar region with radiculopathy.      OPERATION PERFORMED:  Removal of previous L3 through L5 posterior segmental instrumentation with reinsertion of instrumentation L3 through S1 performed by Dr. Camara on 01/02/2020.      COMPLICATIONS:  None.      CONSULTATIONS:  None.      HOSPITAL COURSE:  The patient is a 54-year-old male who presented with a diagnosis of lumbar spinal stenosis with radiculopathy in which surgical management was deemed appropriate and the patient was prepared for surgery on 01/02/2020 at which time he underwent the above described procedure.  Please see the dictated operative report for further details.  He tolerated surgery well and there were no complications.        Postoperatively, he has made an adequate neurosurgical recovery.  He has remained afebrile with stable vital signs.  He is tolerating a regular diet without difficulty and ambulating well.  His operative incision is healing well.  There are no signs of drainage, erythema or edema.  The patient is ready to be discharged to home on 01/04/2020 in improved condition as compared to admission.      All instructions regarding diet, activity, wound care, bowel management, and follow-up were given to the patient, who was released in good condition.      DISCHARGE MEDICATIONS:   1.  Dilaudid 2 mg, instructed to take 1-2 tablets every 4 hours as needed for pain, #50, no refills.   2.  Robaxin 750 mg instructed to take 1 tablet every 6 hours as needed for muscle spasms, #50, no refills.   3.  Senna/docusate 8.6/50, instructed to take 1 tablet twice daily as needed for constipation.      PLAN:   1.  The patient prefers to see his primary care doctor as he lives an hour and a half away, so he wishes to see his primary care doctor for suture removal on or about 01/17/2020.  Then he will return to the Neurosurgery Clinic for followup on  2020 with imaging, as well as 2020.   2.  The patient was instructed to call or return to the clinic for any worsening or changes in symptoms.         RADHA ESTEBAN MD       As dictated by JAMES TOVAR            D: 2020   T: 2020   MT: KHADIJAH      Name:     ZINA OVALLE   MRN:      [id number]        Account:        [id number]   :      1965           Admit Date:     2020                                  Discharge Date: 2020      Document: [id number]

## 2020-01-20 ENCOUNTER — OFFICE VISIT (OUTPATIENT)
Dept: NEUROSURGERY | Facility: CLINIC | Age: 55
End: 2020-01-20
Payer: COMMERCIAL

## 2020-01-20 VITALS
BODY MASS INDEX: 29.12 KG/M2 | WEIGHT: 208 LBS | DIASTOLIC BLOOD PRESSURE: 88 MMHG | RESPIRATION RATE: 18 BRPM | OXYGEN SATURATION: 99 % | HEART RATE: 84 BPM | SYSTOLIC BLOOD PRESSURE: 130 MMHG | HEIGHT: 71 IN

## 2020-01-20 DIAGNOSIS — M48.061 SPINAL STENOSIS OF LUMBAR REGION WITH RADICULOPATHY: ICD-10-CM

## 2020-01-20 DIAGNOSIS — M54.16 SPINAL STENOSIS OF LUMBAR REGION WITH RADICULOPATHY: ICD-10-CM

## 2020-01-20 DIAGNOSIS — Z98.1 S/P LUMBAR SPINAL FUSION: Primary | ICD-10-CM

## 2020-01-20 PROCEDURE — 99207 ZZC NO CHARGE NURSE ONLY: CPT

## 2020-01-20 RX ORDER — HYDROMORPHONE HYDROCHLORIDE 2 MG/1
2-4 TABLET ORAL EVERY 4 HOURS PRN
Qty: 50 TABLET | Refills: 0 | Status: SHIPPED | OUTPATIENT
Start: 2020-01-20 | End: 2020-02-17

## 2020-01-20 ASSESSMENT — PAIN SCALES - GENERAL: PAINLEVEL: MODERATE PAIN (5)

## 2020-01-20 ASSESSMENT — MIFFLIN-ST. JEOR: SCORE: 1795.61

## 2020-01-20 NOTE — PROGRESS NOTES
Fax for Lumbar x-ray sent to [facility] in [place]. Fax # [phone]

## 2020-01-20 NOTE — PATIENT INSTRUCTIONS
Instructions for Patient    Keep your incision clean and dry at all times.     No bathing, swimming, or submerging in water until incision is well healed.      No lifting greater than 10-15 pounds. No bending, twisting, or overhead reaching.    No Pelaton until discussing at follow up    Return in 4  weeks for follow up appointment and xray    Weaning from narcotic pain medications: When it is time, start weaning by extending the time between doses. For example, if you're taking 2 tabs every 4 hours, spread it out to 2 tabs over 4.5, 5, 6 hours. At that point you can certainly cut down to 1 tab, then wean to an as needed basis until completely done with them.     For refills, please call our clinic. A nurse will call you back to obtain a pain assessment. Please call 3-4 business days before you run out so we can ensure there is a provider available at the location you prefer for .    Call the clinic or go to Emergency Room if you develop any new pain, drainage, swelling, or fever. Go to the Emergency Room if sudden onset of severe headache, weakness, confusion, change in level of consciousness, pain, or loss of movement.    Post-operative appointments: Arrive 30 minutes before your 6 week post op, 3 months post op, 6 months post op, 1 year post-op appointments to allow time for an x-ray before each.    Please call clinic with any further questions or concerns    ZUNILDA Griffith  [facility]  [address] [address]  T:  [phone]  F:  [phone]

## 2020-01-20 NOTE — TELEPHONE ENCOUNTER
Prescription Refill Request    Medication: Dilaudid    Surgical procedure/date: REMOVAL OF PREVIOUS L3-L5 POSTERIOR SEGMENTAL INSTRUMENTATION; REINSERTION OF INSTRUMENTATION L3-S1; PELVIC INSTRUMENTATION WITH STEALTH NAVIGATION; L5-S1 BILATERAL TRANSFORAMINAL INTERBODY FUSION AND MANUEL PISANO OSTEOTOMY    Current regimen/number of tabs per day: 1 tab per day    Last refill:  01/04/2020    Pain scale today (0-10):  4-5 back to left of incision     location: [facility]    Will forward request to care team for approval.

## 2020-01-20 NOTE — PROGRESS NOTES
Nurse Suture/Staple removal visit note:  Pain  Patient presents today s/p REMOVAL OF PREVIOUS L3-L5 POSTERIOR SEGMENTAL INSTRUMENTATION; REINSERTION OF INSTRUMENTATION L3-S1; PELVIC INSTRUMENTATION WITH STEALTH NAVIGATION; L5-S1 BILATERAL TRANSFORAMINAL INTERBODY FUSION on 01/02/2020.   Incision  Wound is healing nicely without erythema, fluctuation, induration, drainage, or fever. Shawnee PIPER NP viewed it as well due to slight raise/swelling at the bottom of the incision. Incision edges well approximated without signs of infection. Sutures removed by wife on Saturday 01/18/20.   Assessment/Patient Questions  - X-ray being faxed to [facility] in [facility] per patient request     Denies new numbness/tingling to bilateral lower extremities. Patient reports a little bit of numbness returning that he had preoperatively but not as severe. Will continue to monitor. Denies weakness. Able to dorsi-flex and plantar flex with equal strength.  All questions answered.  Instructions for Patient    Keep your incision clean and dry at all times.     No bathing, swimming, or submerging in water until incision is well healed.      No lifting greater than 10-15 pounds. No bending, twisting, or overhead reaching.    No Pelaton until discussing at follow up    Return in 4  weeks for follow up appointment and xray    Weaning from narcotic pain medications: When it is time, start weaning by extending the time between doses. For example, if you're taking 2 tabs every 4 hours, spread it out to 2 tabs over 4.5, 5, 6 hours. At that point you can certainly cut down to 1 tab, then wean to an as needed basis until completely done with them.     For refills, please call our clinic. A nurse will call you back to obtain a pain assessment. Please call 3-4 business days before you run out so we can ensure there is a provider available at the location you prefer for .    Call the clinic or go to Emergency Room if you develop any new  pain, drainage, swelling, or fever. Go to the Emergency Room if sudden onset of severe headache, weakness, confusion, change in level of consciousness, pain, or loss of movement.    Post-operative appointments: Arrive 30 minutes before your 6 week post op, 3 months post op, 6 months post op, 1 year post-op appointments to allow time for an x-ray before each.    Please call clinic with any further questions or concerns    ZUNILDA Griffith  [facility]  [address] [address]  T:  [phone]  F:  [phone]

## 2020-02-10 ENCOUNTER — TRANSFERRED RECORDS (OUTPATIENT)
Dept: HEALTH INFORMATION MANAGEMENT | Facility: CLINIC | Age: 55
End: 2020-02-10

## 2020-02-10 DIAGNOSIS — Z98.1 S/P LUMBAR SPINAL FUSION: Primary | ICD-10-CM

## 2020-02-10 RX ORDER — OXYCODONE HYDROCHLORIDE 5 MG/1
5 TABLET ORAL EVERY 6 HOURS PRN
Qty: 30 TABLET | Refills: 0 | Status: SHIPPED | OUTPATIENT
Start: 2020-02-10 | End: 2020-03-19

## 2020-02-10 NOTE — TELEPHONE ENCOUNTER
Patient called S/p L3-S1 revision and fusion on 1/2/20. He is reporting all over itching since surgery and some increased neuropathy in bilateral feet. I discussed that the bilateral foot neuropathy can try take some time to resolve after surgery. Patient doesn't report any change in foods, soaps, or detergent to explain itching.  Patient has never taken dilaudid prior to surgery and has been routinely taking since surgery. He states that he still has pain at night and uses the dilaudid to help with it. Patient states he needs a refill on pain medication. We discussed trying oxycodone to see if itching improves, patient has a used oxycodone in the past without issues. Will forward request to provider to review.

## 2020-02-17 ENCOUNTER — TELEPHONE (OUTPATIENT)
Dept: NEUROSURGERY | Facility: CLINIC | Age: 55
End: 2020-02-17

## 2020-02-17 ENCOUNTER — OFFICE VISIT (OUTPATIENT)
Dept: NEUROSURGERY | Facility: CLINIC | Age: 55
End: 2020-02-17
Attending: PHYSICIAN ASSISTANT
Payer: COMMERCIAL

## 2020-02-17 VITALS
HEIGHT: 71 IN | DIASTOLIC BLOOD PRESSURE: 76 MMHG | BODY MASS INDEX: 29.68 KG/M2 | TEMPERATURE: 98.7 F | WEIGHT: 212 LBS | SYSTOLIC BLOOD PRESSURE: 107 MMHG | HEART RATE: 90 BPM

## 2020-02-17 DIAGNOSIS — M54.16 SPINAL STENOSIS OF LUMBAR REGION WITH RADICULOPATHY: ICD-10-CM

## 2020-02-17 DIAGNOSIS — Z98.1 S/P LUMBAR SPINAL FUSION: ICD-10-CM

## 2020-02-17 DIAGNOSIS — M48.061 SPINAL STENOSIS OF LUMBAR REGION WITH RADICULOPATHY: ICD-10-CM

## 2020-02-17 PROCEDURE — 99024 POSTOP FOLLOW-UP VISIT: CPT | Performed by: PHYSICIAN ASSISTANT

## 2020-02-17 RX ORDER — ALLOPURINOL 100 MG/1
100 TABLET ORAL DAILY
COMMUNITY

## 2020-02-17 RX ORDER — HYDROMORPHONE HYDROCHLORIDE 2 MG/1
2 TABLET ORAL EVERY 6 HOURS PRN
Qty: 30 TABLET | Refills: 0 | Status: SHIPPED | OUTPATIENT
Start: 2020-02-17

## 2020-02-17 RX ORDER — METHOCARBAMOL 750 MG/1
750 TABLET, FILM COATED ORAL EVERY 6 HOURS PRN
Qty: 30 TABLET | Refills: 0 | Status: SHIPPED | OUTPATIENT
Start: 2020-02-17 | End: 2020-03-19

## 2020-02-17 ASSESSMENT — MIFFLIN-ST. JEOR: SCORE: 1813.76

## 2020-02-17 ASSESSMENT — PAIN SCALES - GENERAL: PAINLEVEL: MILD PAIN (2)

## 2020-02-17 NOTE — TELEPHONE ENCOUNTER
Per Spencer Fournier PA-C: Please change his 12 week appointment to a follow up with Dr. Camraa and order lumbar xrays AP / Lateral.     Called and spoke to patient's wife. Appt has been changed so that pt is now seeing Dr. Camara. Imaging ordered and faxed to [facility] in [place], [place] per pt request.

## 2020-02-17 NOTE — NURSING NOTE
"Doni Gallardo is a 54 year old male who presents for:  Chief Complaint   Patient presents with     Neurologic Problem      Patient presents for 6 wk f/u low lumbar bilateral pain level 2 with numbness radiating down left leg        Initial Vitals:  /76   Pulse 90   Temp 98.7  F (37.1  C)   Ht 5' 11\" (1.803 m)   Wt 212 lb (96.2 kg)   BMI 29.57 kg/m   Estimated body mass index is 29.57 kg/m  as calculated from the following:    Height as of this encounter: 5' 11\" (1.803 m).    Weight as of this encounter: 212 lb (96.2 kg).. Body surface area is 2.2 meters squared. BP completed using cuff size: large  Mild Pain (2)    Nursing Comments: Patient states he has low back pain level 2 bilateral across lumbar base with numbness down left leg    Dylon Trejo MA  "

## 2020-02-17 NOTE — LETTER
2/17/2020         RE: Doni Gallardo  [address]  [facility] [facility] [address]        Dear Colleague,    Thank you for referring your patient, Doni Gallardo, to the [facility]. Please see a copy of my visit note below.    Neurosurgery follow-up    Mr. Gallardo is 6 weeks status post removal of and replacement of L3-L5 fusion hardware and additional fusion at S1 and S2.  Performed by Dr. Camara.  Patient states that up until 1 week ago he was pain-free and doing very well, then he lifted his 1-year-old granddaughter out of a crib in the middle of the night and hand onset of left-sided low back pain right-sided low back pain, and left anterior leg numbness.  Notes the symptoms have persisted over the past week and not gotten much better.    Exam    B/P: 107/76, T: 98.7, P: 90, R: Data Unavailable     Alert and oriented no acute distress  Incision is well-healed  Lower extremities with appropriate strength bilaterally    Imaging    Lumbar x-rays demonstrate stable placement of fusion hardware from L3-S2    Assessment    L3-S2 fusion, increased pain after bending and lifting 1 week ago      Plan    The patient will continue to not lift more than 20 pounds and avoid heavy strenuous labor for another 6 weeks.  He will return to clinic at that point for further follow-up with Dr. Camara with a lumbar x-ray AP and lateral prior.  He will contact the clinic sooner if he has any further concerns.            Again, thank you for allowing me to participate in the care of your patient.        Sincerely,        Spencer Fournier PA-C

## 2020-02-17 NOTE — PROGRESS NOTES
Neurosurgery follow-up    Mr. Gallardo is 6 weeks status post removal of and replacement of L3-L5 fusion hardware and additional fusion at S1 and S2.  Performed by Dr. Camara.  Patient states that up until 1 week ago he was pain-free and doing very well, then he lifted his 1-year-old granddaughter out of a crib in the middle of the night and hand onset of left-sided low back pain right-sided low back pain, and left anterior leg numbness.  Notes the symptoms have persisted over the past week and not gotten much better.    Exam    B/P: 107/76, T: 98.7, P: 90, R: Data Unavailable     Alert and oriented no acute distress  Incision is well-healed  Lower extremities with appropriate strength bilaterally    Imaging    Lumbar x-rays demonstrate stable placement of fusion hardware from L3-S2    Assessment    L3-S2 fusion, increased pain after bending and lifting 1 week ago      Plan    The patient will continue to not lift more than 20 pounds and avoid heavy strenuous labor for another 6 weeks.  He will return to clinic at that point for further follow-up with Dr. Camara with a lumbar x-ray AP and lateral prior.  He will contact the clinic sooner if he has any further concerns.

## 2020-03-19 DIAGNOSIS — M54.16 SPINAL STENOSIS OF LUMBAR REGION WITH RADICULOPATHY: ICD-10-CM

## 2020-03-19 DIAGNOSIS — M48.061 SPINAL STENOSIS OF LUMBAR REGION WITH RADICULOPATHY: ICD-10-CM

## 2020-03-19 DIAGNOSIS — Z98.1 S/P LUMBAR SPINAL FUSION: Primary | ICD-10-CM

## 2020-03-19 RX ORDER — METHOCARBAMOL 750 MG/1
750 TABLET, FILM COATED ORAL EVERY 6 HOURS PRN
Qty: 30 TABLET | Refills: 0 | Status: SHIPPED | OUTPATIENT
Start: 2020-03-19

## 2020-03-19 RX ORDER — OXYCODONE HYDROCHLORIDE 5 MG/1
5 TABLET ORAL EVERY 6 HOURS PRN
Qty: 30 TABLET | Refills: 0 | Status: SHIPPED | OUTPATIENT
Start: 2020-03-19

## 2020-03-19 NOTE — TELEPHONE ENCOUNTER
Prescription Refill Request    Medication: oxycodone 5 mg and robaxin 750 mg     Surgical procedure/date: s/p lumbar fusion 1/2/20 Dr Camara     Current regimen/number of tabs per day: Oxycodone 5 mg prn and robaxin 750 mg prn     Last refill: 2/10/20 #30 oxycodone, 2/17/20 #30 Robaxin     Pain Assessment: 5-6/10, mid back , left side . Denies leg pain. States he was Reaching for something yesterday in his car and pulled muscle and feels irritated  .      location: [facility]     Any patient questions or concerns: none    Will forward request to care team for approval.

## 2020-04-07 ENCOUNTER — MYC MEDICAL ADVICE (OUTPATIENT)
Dept: NEUROSURGERY | Facility: CLINIC | Age: 55
End: 2020-04-07

## 2020-04-10 ENCOUNTER — VIRTUAL VISIT (OUTPATIENT)
Dept: NEUROSURGERY | Facility: CLINIC | Age: 55
End: 2020-04-10
Attending: NEUROLOGICAL SURGERY
Payer: COMMERCIAL

## 2020-04-10 DIAGNOSIS — M54.16 SPINAL STENOSIS OF LUMBAR REGION WITH RADICULOPATHY: ICD-10-CM

## 2020-04-10 DIAGNOSIS — Z98.1 S/P LUMBAR SPINAL FUSION: Primary | ICD-10-CM

## 2020-04-10 DIAGNOSIS — M48.061 SPINAL STENOSIS OF LUMBAR REGION WITH RADICULOPATHY: ICD-10-CM

## 2020-04-10 PROCEDURE — 99024 POSTOP FOLLOW-UP VISIT: CPT | Performed by: NEUROLOGICAL SURGERY

## 2020-04-10 NOTE — PROGRESS NOTES
Reason for Visit: Due to COVID-19 precautions, this visit was performed over the phone instead of face to face.    It was a pleasure to talk with Doni Gallardo today by phone. He is a 54 year old male who underwent:    Procedure Date: 01/02/2020      PREOPERATIVE DIAGNOSES:   1.  Lumbar foraminal stenosis with radiculopathy.   2.  Flat back syndrome.      POSTOPERATIVE DIAGNOSES:     1.  Lumbar foraminal stenosis with radiculopathy.   2.  Flat back syndrome.      PROCEDURES:   1.  Removal and replacement of previous L3-L5 instrumentation.   2.  Insertion of bilateral S1 pedicle screws.   3.  Bilateral S2 alar iliac pedicle pelvic instrumentation.   4.  Bilateral L5-S1 transforaminal interbody fusion and Smith-Olivia osteotomy.   5.  Posterior arthrodesis L5-S1.      SURGEON:  Satish Camara MD.      ASSISTANT:  Gumaro Neil PA-C.      ANESTHESIA:  General endotracheal anesthesia plus local anesthetic.      ESTIMATED BLOOD LOSS:  700 mL.     Things are going well. No pain pills for the past 2 weeks. Very happy with results of surgery.    Interested in increasing activity. Instructed him he may increase lifting amounts every few weeks by about 5#. Ok to drive tractor.    Plan for follow up visit in December with CT L spine to evaluate fusion      This phone visit took 7 minutes.

## 2020-05-01 ENCOUNTER — TELEPHONE (OUTPATIENT)
Dept: NEUROSURGERY | Facility: CLINIC | Age: 55
End: 2020-05-01

## 2020-05-01 NOTE — TELEPHONE ENCOUNTER
Received a request from [facility] for Hydromorphone for patient.    Called to discuss if this was sent automatically or if patient put in the request.     Voicemail left. Sheet in the bin to refer to on call back. .

## 2020-05-19 ENCOUNTER — MYC MEDICAL ADVICE (OUTPATIENT)
Dept: NEUROSURGERY | Facility: CLINIC | Age: 55
End: 2020-05-19

## 2020-11-16 ENCOUNTER — HEALTH MAINTENANCE LETTER (OUTPATIENT)
Age: 55
End: 2020-11-16

## 2021-01-19 ENCOUNTER — MYC MEDICAL ADVICE (OUTPATIENT)
Dept: NEUROSURGERY | Facility: CLINIC | Age: 56
End: 2021-01-19

## 2021-01-22 NOTE — TELEPHONE ENCOUNTER
"Per Dr Camara: \"Can we let him know that his CT shows that his fusion seems to be healing well. If he is doing well otherwise, he can follow up as needed. \"      Called patient and reviewed above results and recommendation per Dr Camara. Patient verbalized understanding and states that he is doing well. Advised patient to call clinic if any further questions or concerns.                 "

## 2021-02-16 ENCOUNTER — MYC MEDICAL ADVICE (OUTPATIENT)
Dept: NEUROSURGERY | Facility: CLINIC | Age: 56
End: 2021-02-16

## 2021-02-16 NOTE — TELEPHONE ENCOUNTER
Last Visit: 4/10/2020  Next Visit: TBD    Name of Provider: Jax     Assessment: Pt had a fall outside on the ice February 1st, starting on Saturday February 13th pt started experiencing painful numbness similar to what he had prior to surgery. Since Saturday, anytime the pt stands for a prolonged period of time (5 minutes or so) he experiences this. Numbness starts in left buttock and goes to front of left thigh then to groin area. Sitting alleviated the symptoms temporarily.     Pt s/p REMOVAL OF PREVIOUS L3-L5 POSTERIOR SEGMENTAL INSTRUMENTATION; REINSERTION OF INSTRUMENTATION L3-S1; PELVIC INSTRUMENTATION WITH STEALTH NAVIGATION; L5-S1 BILATERAL TRANSFORAMINAL INTERBODY FUSION AND MANUEL PISANO OSTEOTOMY on January 2nd 2020.     Recommendation given: Message forwarded to APPs to review.

## 2021-02-19 NOTE — TELEPHONE ENCOUNTER
"Per Gumaro Neil PA-C, \"If he doesn't seem to be getting better over the next week I would be happy to see him in clinic\"    Called pt to notify him of this update. He scheduled for Monday.   "

## 2021-02-22 ENCOUNTER — ANCILLARY PROCEDURE (OUTPATIENT)
Dept: GENERAL RADIOLOGY | Facility: CLINIC | Age: 56
End: 2021-02-22
Attending: PHYSICIAN ASSISTANT
Payer: COMMERCIAL

## 2021-02-22 ENCOUNTER — OFFICE VISIT (OUTPATIENT)
Dept: NEUROSURGERY | Facility: CLINIC | Age: 56
End: 2021-02-22
Attending: PHYSICIAN ASSISTANT
Payer: COMMERCIAL

## 2021-02-22 VITALS
OXYGEN SATURATION: 98 % | DIASTOLIC BLOOD PRESSURE: 95 MMHG | WEIGHT: 216 LBS | HEART RATE: 67 BPM | HEIGHT: 71 IN | SYSTOLIC BLOOD PRESSURE: 155 MMHG | BODY MASS INDEX: 30.24 KG/M2 | TEMPERATURE: 98.4 F

## 2021-02-22 DIAGNOSIS — M48.061 SPINAL STENOSIS OF LUMBAR REGION WITH RADICULOPATHY: ICD-10-CM

## 2021-02-22 DIAGNOSIS — M54.16 SPINAL STENOSIS OF LUMBAR REGION WITH RADICULOPATHY: Primary | ICD-10-CM

## 2021-02-22 DIAGNOSIS — M54.16 SPINAL STENOSIS OF LUMBAR REGION WITH RADICULOPATHY: ICD-10-CM

## 2021-02-22 DIAGNOSIS — M48.061 SPINAL STENOSIS OF LUMBAR REGION WITH RADICULOPATHY: Primary | ICD-10-CM

## 2021-02-22 PROBLEM — F10.20 ALCOHOL DEPENDENCE (H): Status: ACTIVE | Noted: 2021-02-22

## 2021-02-22 PROCEDURE — 72100 X-RAY EXAM L-S SPINE 2/3 VWS: CPT | Performed by: RADIOLOGY

## 2021-02-22 PROCEDURE — 99213 OFFICE O/P EST LOW 20 MIN: CPT | Performed by: PHYSICIAN ASSISTANT

## 2021-02-22 PROCEDURE — 72040 X-RAY EXAM NECK SPINE 2-3 VW: CPT | Performed by: RADIOLOGY

## 2021-02-22 RX ORDER — METHYLPREDNISOLONE 4 MG
TABLET, DOSE PACK ORAL
Qty: 21 TABLET | Refills: 0 | Status: SHIPPED | OUTPATIENT
Start: 2021-02-22

## 2021-02-22 ASSESSMENT — MIFFLIN-ST. JEOR: SCORE: 1826.9

## 2021-02-22 ASSESSMENT — PAIN SCALES - GENERAL: PAINLEVEL: MODERATE PAIN (4)

## 2021-02-22 NOTE — NURSING NOTE
"Doni Gallardo is a 55 year old male who presents for:  Chief Complaint   Patient presents with     Follow Up     Lumbar stenosis / Weakness and pain/numbness in legs post-fall        Initial Vitals:  BP (!) 154/100   Pulse 98   Temp 98.4  F (36.9  C)   Ht 5' 11\" (1.803 m)   Wt 216 lb (98 kg)   SpO2 98%   BMI 30.13 kg/m   Estimated body mass index is 30.13 kg/m  as calculated from the following:    Height as of this encounter: 5' 11\" (1.803 m).    Weight as of this encounter: 216 lb (98 kg).. Body surface area is 2.22 meters squared. BP completed using cuff size: regular  Moderate Pain (4)    Nursing Comments: Patient presents for Lumbar stenosis / Weakness and pain/numbness in legs post-fall    Dylon Trejo MA  "

## 2021-02-22 NOTE — LETTER
2/22/2021         RE: Doni Gallardo  [address]  [facility] [address]        Dear Colleague,    Thank you for referring your patient, Doni Gallardo, to the [facility] [place]. Please see a copy of my visit note below.    NEUROSURGERY CLINIC PROGRESS NOTE    DATE OF VISIT: 2/22/2021    Procedure Date: 01/02/2020     PROCEDURES:   1.  Removal and replacement of previous L3-L5 instrumentation.   2.  Insertion of bilateral S1 pedicle screws.   3.  Bilateral S2 alar iliac pedicle pelvic instrumentation.   4.  Bilateral L5-S1 transforaminal interbody fusion and Smith-Olivia osteotomy.   5.  Posterior arthrodesis L5-S1.     HPI:     Doni Gallardo is a pleasant 55 year old male who presents to the clinic today for a follow-up visit. Unfortunaly Mr. Gallardo slipped on some ice, fell and landed directly on his coccyx. The fall was on 02/01/2021. Since that fall he has been experiencing some symptoms similar to his pre-operative symptoms to include lumbar spine pain with left leg radiculopathy. He describes the symptoms as a  Constant, with fluctuating intensity, sharp, burning pain that initiates in the midline low lumbar region and radiates distally in  the left L4 distribution. This pain is accompanied with paresthesia in the same distribution, although he does have known peripheral neuropathy. Prolonged walking and standing seems to aggravate the symptoms, while alleviation is obtained by sitting and lying down.  No other concerns are voiced.      Current Outpatient Medications   Medication     allopurinol (ZYLOPRIM) 100 MG tablet     Cyanocobalamin (B-12 PO)     losartan-hydrochlorothiazide (HYZAAR) 100-25 MG tablet     methylPREDNISolone (MEDROL DOSEPAK) 4 MG tablet therapy pack     omeprazole (PRILOSEC) 20 MG DR capsule     oxyCODONE (ROXICODONE) 5 MG tablet     atenolol (TENORMIN) 50 MG tablet     HYDROmorphone (DILAUDID) 2 MG tablet     methocarbamol  "(ROBAXIN) 750 MG tablet     multivitamin w/minerals (MULTI-VITAMIN) tablet     Omega-3 Fatty Acids (FISH OIL) 1200 MG capsule     senna-docusate (SENOKOT-S/PERICOLACE) 8.6-50 MG tablet     No current facility-administered medications for this visit.        Allergies   Allergen Reactions     Diazepam      Other reaction(s): Hallucinations  Saw truck in room after being given Valium 2 mg oral- as patient and wife stated     Pregabalin Other (See Comments)     Swelling       Past Medical History:   Diagnosis Date     Eczema      Gastroesophageal reflux disease      Gout      Hyperglyceridemia      Hypertension      OA (osteoarthritis of spine)      Osteoarthritis of knees, bilateral        Review Of Systems    Skin: negative  Eyes: negative  Ears/Nose/Throat: negative  Respiratory: No shortness of breath, dyspnea on exertion, cough, or hemoptysis  Cardiovascular: negative  Gastrointestinal: negative  Musculoskeletal: back pain  Neurologic: numbness or tingling of feet.  Psychiatric: negative  Hematologic/Lymphatic/Immunologic: negative  Endocrine: negative    OBJECTIVE:    BP (!) 154/100   Pulse 98   Temp 98.4  F (36.9  C)   Ht 5' 11\" (1.803 m)   Wt 216 lb (98 kg)   SpO2 98%   BMI 30.13 kg/m      Imaging:    XR Lumbar Spine 2/3 Views (Order: [id number]) - 2/22/2021      Radiographic Findings: Full radiological report in chart. I personally reviewed the images with the patient today.    Exam:    Patient appears comfortable and in no apparent distress. Moving all extremities.  Gait is non-antalgic.  CN II-XII grossly intact, alert and appropriate with conversation and following  commands  Bilateral upper extremities with full strength including hand intrinsics and grasp.  Sensation intact throughout.  Bilateral lower extremities 5/5 strength including plantar and dorsiflexion.  Normal sensation throughout bilaterally.      ASSESSMENT:    1. Spinal stenosis of lumbar region with radiculopathy        PLAN:    Doni " KAILA Gallardo' s updated x-rays look good despite his fall. We have provided him with a medrol Dosepak in hopes that his symptoms will alleviate. He is also scheduled to be evaluated by Neurology for his peripheral neuropathy.     At this time, the patient is comfortable with the plan to return to the Neurosurgery Clinic on an as needed basis.     He will call or return to the clinic for any worsening or changes in symptoms.    Respectfully,     WENDY Pastor PA-C          Again, thank you for allowing me to participate in the care of your patient.        Sincerely,        Gumaro Neil PA-C

## 2021-02-22 NOTE — PROGRESS NOTES
NEUROSURGERY CLINIC PROGRESS NOTE    DATE OF VISIT: 2/22/2021    Procedure Date: 01/02/2020     PROCEDURES:   1.  Removal and replacement of previous L3-L5 instrumentation.   2.  Insertion of bilateral S1 pedicle screws.   3.  Bilateral S2 alar iliac pedicle pelvic instrumentation.   4.  Bilateral L5-S1 transforaminal interbody fusion and Smith-Olivia osteotomy.   5.  Posterior arthrodesis L5-S1.     HPI:     Doni Gallardo is a pleasant 55 year old male who presents to the clinic today for a follow-up visit. Unfortunaly Mr. Gallardo slipped on some ice, fell and landed directly on his coccyx. The fall was on 02/01/2021. Since that fall he has been experiencing some symptoms similar to his pre-operative symptoms to include lumbar spine pain with left leg radiculopathy. He describes the symptoms as a  Constant, with fluctuating intensity, sharp, burning pain that initiates in the midline low lumbar region and radiates distally in  the left L4 distribution. This pain is accompanied with paresthesia in the same distribution, although he does have known peripheral neuropathy. Prolonged walking and standing seems to aggravate the symptoms, while alleviation is obtained by sitting and lying down.  No other concerns are voiced.      Current Outpatient Medications   Medication     allopurinol (ZYLOPRIM) 100 MG tablet     Cyanocobalamin (B-12 PO)     losartan-hydrochlorothiazide (HYZAAR) 100-25 MG tablet     methylPREDNISolone (MEDROL DOSEPAK) 4 MG tablet therapy pack     omeprazole (PRILOSEC) 20 MG DR capsule     oxyCODONE (ROXICODONE) 5 MG tablet     atenolol (TENORMIN) 50 MG tablet     HYDROmorphone (DILAUDID) 2 MG tablet     methocarbamol (ROBAXIN) 750 MG tablet     multivitamin w/minerals (MULTI-VITAMIN) tablet     Omega-3 Fatty Acids (FISH OIL) 1200 MG capsule     senna-docusate (SENOKOT-S/PERICOLACE) 8.6-50 MG tablet     No current facility-administered medications for this visit.        Allergies   Allergen  "Reactions     Diazepam      Other reaction(s): Hallucinations  Saw truck in room after being given Valium 2 mg oral- as patient and wife stated     Pregabalin Other (See Comments)     Swelling       Past Medical History:   Diagnosis Date     Eczema      Gastroesophageal reflux disease      Gout      Hyperglyceridemia      Hypertension      OA (osteoarthritis of spine)      Osteoarthritis of knees, bilateral        Review Of Systems    Skin: negative  Eyes: negative  Ears/Nose/Throat: negative  Respiratory: No shortness of breath, dyspnea on exertion, cough, or hemoptysis  Cardiovascular: negative  Gastrointestinal: negative  Musculoskeletal: back pain  Neurologic: numbness or tingling of feet.  Psychiatric: negative  Hematologic/Lymphatic/Immunologic: negative  Endocrine: negative    OBJECTIVE:    BP (!) 154/100   Pulse 98   Temp 98.4  F (36.9  C)   Ht 5' 11\" (1.803 m)   Wt 216 lb (98 kg)   SpO2 98%   BMI 30.13 kg/m      Imaging:    XR Lumbar Spine 2/3 Views (Order: [id number]) - 2/22/2021      Radiographic Findings: Full radiological report in chart. I personally reviewed the images with the patient today.    Exam:    Patient appears comfortable and in no apparent distress. Moving all extremities.  Gait is non-antalgic.  CN II-XII grossly intact, alert and appropriate with conversation and following  commands  Bilateral upper extremities with full strength including hand intrinsics and grasp.  Sensation intact throughout.  Bilateral lower extremities 5/5 strength including plantar and dorsiflexion.  Normal sensation throughout bilaterally.      ASSESSMENT:    1. Spinal stenosis of lumbar region with radiculopathy        PLAN:    Doni Gallardo' s updated x-rays look good despite his fall. We have provided him with a medrol Dosepak in hopes that his symptoms will alleviate. He is also scheduled to be evaluated by Neurology for his peripheral neuropathy.     At this time, the patient is comfortable with the " plan to return to the Neurosurgery Clinic on an as needed basis.     He will call or return to the clinic for any worsening or changes in symptoms.    Respectfully,     WENDY Pastor, PAGaudencioC

## 2021-09-18 ENCOUNTER — HEALTH MAINTENANCE LETTER (OUTPATIENT)
Age: 56
End: 2021-09-18

## 2022-01-08 ENCOUNTER — HEALTH MAINTENANCE LETTER (OUTPATIENT)
Age: 57
End: 2022-01-08

## 2022-11-20 ENCOUNTER — HEALTH MAINTENANCE LETTER (OUTPATIENT)
Age: 57
End: 2022-11-20

## 2024-01-28 ENCOUNTER — HEALTH MAINTENANCE LETTER (OUTPATIENT)
Age: 59
End: 2024-01-28

## (undated) DEVICE — PACK LARGE SPINE SNE15LSFSE

## (undated) DEVICE — CATH TRAY FOLEY SURESTEP 16FR W/URINE MTR STATLK LF A303416A

## (undated) DEVICE — GLOVE PROTEXIS MICRO 7.5  2D73PM75

## (undated) DEVICE — SU MONOCRYL 4-0 PS-2 18" UND Y496G

## (undated) DEVICE — SU VICRYL 0 CT-1 CR 8X18" J740D

## (undated) DEVICE — MARKER SPHERES PASSIVE MEDT PACK 5 8801075

## (undated) DEVICE — SPONGE COTTONOID 1X3" 80-1408

## (undated) DEVICE — GLOVE PROTEXIS BLUE W/NEU-THERA 8.5  2D73EB85

## (undated) DEVICE — DRAPE O ARM TUBE 9732722

## (undated) DEVICE — SU ETHILON 3-0 FS-1 18" 669H

## (undated) DEVICE — CUSHION INSERT LG PRONE VIEW JACKSON TABLE

## (undated) DEVICE — SOL WATER IRRIG 1000ML BOTTLE 2F7114

## (undated) DEVICE — KIT PATIENT JACKSON 1 SPK10118

## (undated) DEVICE — WIPES FOLEY CARE SURESTEP PROVON DFC100

## (undated) DEVICE — SU VICRYL 2-0 CT-2 CR 8X18" J726D

## (undated) DEVICE — NDL SPINAL 18GA 3.5" 405184

## (undated) DEVICE — ESU GROUND PAD ADULT W/CORD E7507

## (undated) DEVICE — PREP CHLORAPREP 26ML TINTED ORANGE  260815

## (undated) DEVICE — SPONGE SURGIFOAM 100 1974

## (undated) DEVICE — SU SILK 2-0 FS-1 18" 685G

## (undated) DEVICE — MANIFOLD NEPTUNE 4 PORT 700-20

## (undated) DEVICE — DRAPE VARI-LENS II MICROSCOPE 52X150" 6120VL2

## (undated) DEVICE — LINEN TOWEL PACK X5 5464

## (undated) RX ORDER — PROPOFOL 10 MG/ML
INJECTION, EMULSION INTRAVENOUS
Status: DISPENSED
Start: 2020-01-02

## (undated) RX ORDER — BUPIVACAINE HYDROCHLORIDE AND EPINEPHRINE 2.5; 5 MG/ML; UG/ML
INJECTION, SOLUTION EPIDURAL; INFILTRATION; INTRACAUDAL; PERINEURAL
Status: DISPENSED
Start: 2020-01-02

## (undated) RX ORDER — ALBUMIN, HUMAN INJ 5% 5 %
SOLUTION INTRAVENOUS
Status: DISPENSED
Start: 2020-01-02

## (undated) RX ORDER — GLYCOPYRROLATE 0.2 MG/ML
INJECTION, SOLUTION INTRAMUSCULAR; INTRAVENOUS
Status: DISPENSED
Start: 2020-01-02

## (undated) RX ORDER — HYDROMORPHONE HYDROCHLORIDE 1 MG/ML
INJECTION, SOLUTION INTRAMUSCULAR; INTRAVENOUS; SUBCUTANEOUS
Status: DISPENSED
Start: 2020-01-02

## (undated) RX ORDER — VECURONIUM BROMIDE 1 MG/ML
INJECTION, POWDER, LYOPHILIZED, FOR SOLUTION INTRAVENOUS
Status: DISPENSED
Start: 2020-01-02

## (undated) RX ORDER — ONDANSETRON 2 MG/ML
INJECTION INTRAMUSCULAR; INTRAVENOUS
Status: DISPENSED
Start: 2020-01-02

## (undated) RX ORDER — NEOSTIGMINE METHYLSULFATE 1 MG/ML
VIAL (ML) INJECTION
Status: DISPENSED
Start: 2020-01-02

## (undated) RX ORDER — LIDOCAINE HYDROCHLORIDE 20 MG/ML
INJECTION, SOLUTION EPIDURAL; INFILTRATION; INTRACAUDAL; PERINEURAL
Status: DISPENSED
Start: 2020-01-02

## (undated) RX ORDER — ACETAMINOPHEN 325 MG/1
TABLET ORAL
Status: DISPENSED
Start: 2020-01-02

## (undated) RX ORDER — CEFAZOLIN SODIUM 2 G/100ML
INJECTION, SOLUTION INTRAVENOUS
Status: DISPENSED
Start: 2020-01-02

## (undated) RX ORDER — FENTANYL CITRATE 50 UG/ML
INJECTION, SOLUTION INTRAMUSCULAR; INTRAVENOUS
Status: DISPENSED
Start: 2020-01-02

## (undated) RX ORDER — VANCOMYCIN HYDROCHLORIDE 1 G/20ML
INJECTION, POWDER, LYOPHILIZED, FOR SOLUTION INTRAVENOUS
Status: DISPENSED
Start: 2020-01-02

## (undated) RX ORDER — CEFAZOLIN SODIUM 1 G/3ML
INJECTION, POWDER, FOR SOLUTION INTRAMUSCULAR; INTRAVENOUS
Status: DISPENSED
Start: 2020-01-02

## (undated) RX ORDER — DEXAMETHASONE SODIUM PHOSPHATE 4 MG/ML
INJECTION, SOLUTION INTRA-ARTICULAR; INTRALESIONAL; INTRAMUSCULAR; INTRAVENOUS; SOFT TISSUE
Status: DISPENSED
Start: 2020-01-02